# Patient Record
Sex: FEMALE | Race: WHITE | NOT HISPANIC OR LATINO | Employment: FULL TIME | URBAN - METROPOLITAN AREA
[De-identification: names, ages, dates, MRNs, and addresses within clinical notes are randomized per-mention and may not be internally consistent; named-entity substitution may affect disease eponyms.]

---

## 2017-01-06 ENCOUNTER — OFFICE VISIT - HEALTHEAST (OUTPATIENT)
Dept: BEHAVIORAL HEALTH | Facility: CLINIC | Age: 37
End: 2017-01-06

## 2017-01-06 DIAGNOSIS — F41.1 GENERALIZED ANXIETY DISORDER: ICD-10-CM

## 2017-01-07 ENCOUNTER — AMBULATORY - HEALTHEAST (OUTPATIENT)
Dept: BEHAVIORAL HEALTH | Facility: CLINIC | Age: 37
End: 2017-01-07

## 2017-01-09 ENCOUNTER — COMMUNICATION - HEALTHEAST (OUTPATIENT)
Dept: FAMILY MEDICINE | Facility: CLINIC | Age: 37
End: 2017-01-09

## 2017-01-17 ENCOUNTER — OFFICE VISIT - HEALTHEAST (OUTPATIENT)
Dept: BEHAVIORAL HEALTH | Facility: CLINIC | Age: 37
End: 2017-01-17

## 2017-01-17 DIAGNOSIS — F41.1 GENERALIZED ANXIETY DISORDER: ICD-10-CM

## 2017-01-31 ENCOUNTER — OFFICE VISIT - HEALTHEAST (OUTPATIENT)
Dept: BEHAVIORAL HEALTH | Facility: CLINIC | Age: 37
End: 2017-01-31

## 2017-01-31 DIAGNOSIS — F41.1 GENERALIZED ANXIETY DISORDER: ICD-10-CM

## 2017-03-07 ENCOUNTER — OFFICE VISIT - HEALTHEAST (OUTPATIENT)
Dept: BEHAVIORAL HEALTH | Facility: CLINIC | Age: 37
End: 2017-03-07

## 2017-03-07 DIAGNOSIS — F41.1 GENERALIZED ANXIETY DISORDER: ICD-10-CM

## 2017-03-20 ENCOUNTER — OFFICE VISIT - HEALTHEAST (OUTPATIENT)
Dept: BEHAVIORAL HEALTH | Facility: CLINIC | Age: 37
End: 2017-03-20

## 2017-03-20 DIAGNOSIS — F41.1 GENERALIZED ANXIETY DISORDER: ICD-10-CM

## 2017-04-24 ENCOUNTER — OFFICE VISIT - HEALTHEAST (OUTPATIENT)
Dept: BEHAVIORAL HEALTH | Facility: CLINIC | Age: 37
End: 2017-04-24

## 2017-04-24 DIAGNOSIS — F41.1 GENERALIZED ANXIETY DISORDER: ICD-10-CM

## 2017-05-23 ENCOUNTER — OFFICE VISIT - HEALTHEAST (OUTPATIENT)
Dept: BEHAVIORAL HEALTH | Facility: CLINIC | Age: 37
End: 2017-05-23

## 2017-05-23 DIAGNOSIS — F41.1 GENERALIZED ANXIETY DISORDER: ICD-10-CM

## 2017-05-24 ENCOUNTER — AMBULATORY - HEALTHEAST (OUTPATIENT)
Dept: BEHAVIORAL HEALTH | Facility: CLINIC | Age: 37
End: 2017-05-24

## 2017-06-28 ENCOUNTER — OFFICE VISIT - HEALTHEAST (OUTPATIENT)
Dept: BEHAVIORAL HEALTH | Facility: CLINIC | Age: 37
End: 2017-06-28

## 2017-06-28 DIAGNOSIS — F41.1 GENERALIZED ANXIETY DISORDER: ICD-10-CM

## 2017-07-25 ENCOUNTER — OFFICE VISIT - HEALTHEAST (OUTPATIENT)
Dept: BEHAVIORAL HEALTH | Facility: CLINIC | Age: 37
End: 2017-07-25

## 2017-07-25 DIAGNOSIS — F41.1 GENERALIZED ANXIETY DISORDER: ICD-10-CM

## 2017-08-28 ENCOUNTER — OFFICE VISIT - HEALTHEAST (OUTPATIENT)
Dept: BEHAVIORAL HEALTH | Facility: CLINIC | Age: 37
End: 2017-08-28

## 2017-08-28 DIAGNOSIS — F41.1 GENERALIZED ANXIETY DISORDER: ICD-10-CM

## 2017-12-20 ENCOUNTER — AMBULATORY - HEALTHEAST (OUTPATIENT)
Dept: BEHAVIORAL HEALTH | Facility: CLINIC | Age: 37
End: 2017-12-20

## 2017-12-20 ENCOUNTER — OFFICE VISIT - HEALTHEAST (OUTPATIENT)
Dept: BEHAVIORAL HEALTH | Facility: CLINIC | Age: 37
End: 2017-12-20

## 2017-12-20 DIAGNOSIS — F41.1 GENERALIZED ANXIETY DISORDER: ICD-10-CM

## 2018-01-04 ENCOUNTER — OFFICE VISIT - HEALTHEAST (OUTPATIENT)
Dept: BEHAVIORAL HEALTH | Facility: CLINIC | Age: 38
End: 2018-01-04

## 2018-01-04 DIAGNOSIS — Z91.199 FAILURE TO ATTEND APPOINTMENT: ICD-10-CM

## 2018-06-03 ENCOUNTER — RECORDS - HEALTHEAST (OUTPATIENT)
Dept: ADMINISTRATIVE | Facility: OTHER | Age: 38
End: 2018-06-03

## 2018-08-16 ENCOUNTER — OFFICE VISIT - HEALTHEAST (OUTPATIENT)
Dept: FAMILY MEDICINE | Facility: CLINIC | Age: 38
End: 2018-08-16

## 2018-08-16 DIAGNOSIS — D22.9 BENIGN NEVUS: ICD-10-CM

## 2018-08-16 DIAGNOSIS — L72.3 SEBACEOUS CYST: ICD-10-CM

## 2018-08-29 ENCOUNTER — OFFICE VISIT - HEALTHEAST (OUTPATIENT)
Dept: BEHAVIORAL HEALTH | Facility: CLINIC | Age: 38
End: 2018-08-29

## 2018-08-29 ENCOUNTER — AMBULATORY - HEALTHEAST (OUTPATIENT)
Dept: BEHAVIORAL HEALTH | Facility: CLINIC | Age: 38
End: 2018-08-29

## 2018-08-29 DIAGNOSIS — F41.1 GENERALIZED ANXIETY DISORDER: ICD-10-CM

## 2018-09-07 ENCOUNTER — OFFICE VISIT - HEALTHEAST (OUTPATIENT)
Dept: FAMILY MEDICINE | Facility: CLINIC | Age: 38
End: 2018-09-07

## 2018-09-07 DIAGNOSIS — Z13.220 LIPID SCREENING: ICD-10-CM

## 2018-09-07 DIAGNOSIS — Z13.1 SCREENING FOR DIABETES MELLITUS: ICD-10-CM

## 2018-09-07 DIAGNOSIS — Z00.00 ROUTINE GENERAL MEDICAL EXAMINATION AT A HEALTH CARE FACILITY: ICD-10-CM

## 2018-09-07 ASSESSMENT — MIFFLIN-ST. JEOR: SCORE: 1448.02

## 2018-09-12 ENCOUNTER — OFFICE VISIT - HEALTHEAST (OUTPATIENT)
Dept: BEHAVIORAL HEALTH | Facility: CLINIC | Age: 38
End: 2018-09-12

## 2018-09-12 ENCOUNTER — AMBULATORY - HEALTHEAST (OUTPATIENT)
Dept: LAB | Facility: CLINIC | Age: 38
End: 2018-09-12

## 2018-09-12 DIAGNOSIS — F41.1 GENERALIZED ANXIETY DISORDER: ICD-10-CM

## 2018-09-12 DIAGNOSIS — Z13.1 SCREENING FOR DIABETES MELLITUS: ICD-10-CM

## 2018-09-12 DIAGNOSIS — Z13.220 LIPID SCREENING: ICD-10-CM

## 2018-09-12 LAB
CHOLEST SERPL-MCNC: 189 MG/DL
FASTING STATUS PATIENT QL REPORTED: YES
FASTING STATUS PATIENT QL REPORTED: YES
GLUCOSE BLD-MCNC: 105 MG/DL (ref 70–99)
HDLC SERPL-MCNC: 63 MG/DL
LDLC SERPL CALC-MCNC: 116 MG/DL
TRIGL SERPL-MCNC: 50 MG/DL

## 2018-09-20 ENCOUNTER — OFFICE VISIT - HEALTHEAST (OUTPATIENT)
Dept: BEHAVIORAL HEALTH | Facility: CLINIC | Age: 38
End: 2018-09-20

## 2018-09-20 DIAGNOSIS — F41.1 GENERALIZED ANXIETY DISORDER: ICD-10-CM

## 2018-10-02 ENCOUNTER — OFFICE VISIT - HEALTHEAST (OUTPATIENT)
Dept: BEHAVIORAL HEALTH | Facility: CLINIC | Age: 38
End: 2018-10-02

## 2018-10-02 DIAGNOSIS — F41.1 GENERALIZED ANXIETY DISORDER: ICD-10-CM

## 2018-12-19 ENCOUNTER — AMBULATORY - HEALTHEAST (OUTPATIENT)
Dept: BEHAVIORAL HEALTH | Facility: CLINIC | Age: 38
End: 2018-12-19

## 2018-12-19 ENCOUNTER — OFFICE VISIT - HEALTHEAST (OUTPATIENT)
Dept: BEHAVIORAL HEALTH | Facility: CLINIC | Age: 38
End: 2018-12-19

## 2018-12-19 DIAGNOSIS — F41.1 GENERALIZED ANXIETY DISORDER: ICD-10-CM

## 2020-03-02 ENCOUNTER — HEALTH MAINTENANCE LETTER (OUTPATIENT)
Age: 40
End: 2020-03-02

## 2020-08-25 ENCOUNTER — OFFICE VISIT - HEALTHEAST (OUTPATIENT)
Dept: FAMILY MEDICINE | Facility: CLINIC | Age: 40
End: 2020-08-25

## 2020-08-25 DIAGNOSIS — Z00.00 ROUTINE HISTORY AND PHYSICAL EXAMINATION OF ADULT: ICD-10-CM

## 2020-08-25 DIAGNOSIS — Z12.4 SCREENING FOR CERVICAL CANCER: ICD-10-CM

## 2020-08-25 ASSESSMENT — MIFFLIN-ST. JEOR: SCORE: 1426.14

## 2020-08-26 ENCOUNTER — COMMUNICATION - HEALTHEAST (OUTPATIENT)
Dept: FAMILY MEDICINE | Facility: CLINIC | Age: 40
End: 2020-08-26

## 2020-08-26 LAB
HPV SOURCE: NORMAL
HUMAN PAPILLOMA VIRUS 16 DNA: NEGATIVE
HUMAN PAPILLOMA VIRUS 18 DNA: NEGATIVE
HUMAN PAPILLOMA VIRUS FINAL DIAGNOSIS: NORMAL
HUMAN PAPILLOMA VIRUS OTHER HR: NEGATIVE
SPECIMEN DESCRIPTION: NORMAL

## 2020-11-11 ENCOUNTER — RECORDS - HEALTHEAST (OUTPATIENT)
Dept: ADMINISTRATIVE | Facility: OTHER | Age: 40
End: 2020-11-11

## 2020-12-14 ENCOUNTER — HEALTH MAINTENANCE LETTER (OUTPATIENT)
Age: 40
End: 2020-12-14

## 2021-04-18 ENCOUNTER — HEALTH MAINTENANCE LETTER (OUTPATIENT)
Age: 41
End: 2021-04-18

## 2021-06-01 VITALS — WEIGHT: 163 LBS | HEIGHT: 67 IN | BODY MASS INDEX: 25.58 KG/M2

## 2021-06-01 VITALS — BODY MASS INDEX: 25.8 KG/M2 | WEIGHT: 163.5 LBS

## 2021-06-04 VITALS
HEIGHT: 67 IN | HEART RATE: 90 BPM | WEIGHT: 159.05 LBS | DIASTOLIC BLOOD PRESSURE: 64 MMHG | OXYGEN SATURATION: 96 % | BODY MASS INDEX: 24.96 KG/M2 | SYSTOLIC BLOOD PRESSURE: 106 MMHG

## 2021-06-08 NOTE — PROGRESS NOTES
Outpatient Mental Health Treatment Plan    Name:  Laney Flood  :  1980  MRN:  [id number]    Treatment Plan:  Updated Treatment Plan  Intake/initial treatment plan date:  16, Update: 17  Benefit and risks and alternatives have been discussed: Yes  Is this treatment appropriate with minimal intrusion/restrictions: Yes  Estimated duration of treatment:  Approximately 10-12 sessions.  Anticipated frequency of services:  Every 1-2 weeks  Necessity for frequency: This frequency is needed to establish therapeutic goals and for continuity of care in order to monitor progress.  Necessity for treatment: To address cognitive, behavioral, and/or emotional barriers in order to work toward goals and to improve quality of life.    Plan:           ?   ? Anxiety    Goal:  Decrease average anxiety level from 3/4 to 2/4.   Strategies: ? [x]Learn and practice relaxation techniques and other coping strategies (e.g., thought stopping, reframing, meditation)     ? [x] Increase involvement in meaningful activities     ? [x] Discuss sleep hygiene     ? [x] Explore thoughts and expectations about self and others     ? [x] Identify and monitor triggers for panic/anxiety symptoms     ? [x] Implement physical activity routine (with physician approval)     ? [x] Consider introduction of bibliotherapy and/or videos     ? [x] Continue compliance with medical treatment plan (or explore barriers)                                       []     ?Degree to which this is a problem: 3  Degree to which goal is met: in progress  Date of Review: in 3 months.       Functional Impairment:  1=Not at all/Rarely  2=Some days  3=Most Days  4=Every Day    Personal : 3  Family : 2  Social : 2   Work/school : 3    Diagnosis:    Generalized Anxiety disorder     WHODAS 2.0 12-item version 3       Scores presented in qualifiers to represent level of disability.  MILD Problem (slight, low, ...) 5-24%     H1= 0  H2= 0  H3= 0    Clinical assessments and  "measures completed:. CLARA-7, PHQ-9, CAGE-AID and PANSI     Strengths:Laney is a planful person and someone who likes to be around people and gets along well with them. She considers herself to be a good friend and values having fun.  Limitations:  Laney has a tendency to be hard on herself and over-analyzing/over-thinking. She is concerned with finances.   Cultural Considerations: Lnaey grew up in a small city in [place], a member of a large family where she learned to be resourceful and get along with everyone. She states, \"My family is very much in your business. That's how we are.\"       Persons responsible for this plan: Patient            Psychotherapist Signature           Patient Signature:              Guardian Signature             Provider: Performed and documented by Melita [facility]   Date:  1/7/2017      This note was created with help of Dragon dictation software.  Grammatical / typing errors are not intentional and inherent to the software.    "

## 2021-06-08 NOTE — PROGRESS NOTES
1/17/17  Start time: 4:08 PM    Stop Time: 5:08 PM   Session # 2    Laney Flood is a 36 y.o. female is being seen today for    Chief Complaint   Patient presents with     Follow-up     Anxiety   .     New symptoms or complaints: Reported none.    Functional Impairment:   Personal: 3  Family: 3  Work: 2  Social:2    Clinical assessment of mental status: Laney presented on time for her appointment. She was oriented x3, open and cooperative, and dressed appropriately for this session and weather. Her memory was Normal cognitive functioning . Her speech was within normal. Language was appropriate to discussion. Concentration and focus is Within normal. Psychosis is not noted nor reported. Her mood is euthymic. Congruent w/content of speech. Affect is congruent with speech and is Congruent w/content of speech. Fund of knowledge is adequate. Insight is adequate for therapy.    Suicidal/Homicidal Ideation present: None Reported This Session    Patient's impression of their current status: Patient discussed work and relationship issues. She processed some of the sources of stress in her life, including her 's health issues and how this impacts their lives.      Therapist impression of patients current state: Patient is engaged in the psychotherapeutic process. Processed thoughts, feelings, beliefs. She is willing to explore coping techniques, including thought defusion techniques and identifying 'unhelpful thoughts' and introducing alternative healthy behaviors in the face of them.  She considers concepts of self that contribute to anxiety.  She is very insightful and receptive to framework for learning to live in different relationship with anxiety while also willing to learn/apply CBT strategies.     Type of psychotherapeutic technique provided: Insight oriented, Client centered, Solution-focused and CBT    Progress toward short term goals:Progress as expected, patient is applying techniques discussed in  "session to good benefit.    Review of long term goals: Patient is making good progress on her long-term goals.     Diagnosis:   1. Generalized anxiety disorder        Plan and Follow up: Patient agreed to continue to apply 3-2-1 strategy for reducing anxiety/panic symptoms, as needed and to practice looking \"at thoughts rather than from thoughts\", applying defusion strategy to unhelpful thoughts. Daily breathing exercise (15 minutes/day), to practice holding attention on sensing.  Plans to return in 2 weeks.     Discharge Criteria/Planning: Patient will continue with follow-up until therapy can be discontinued without return of signs and symptoms.          "

## 2021-06-08 NOTE — PROGRESS NOTES
"1/6/17  Start time: 3:12 PM    Stop Time: 4:04 PM   Session # 1    Laney Flood is a 36 y.o. female is being seen today for    Chief Complaint   Patient presents with     Follow-up     Anxiety   .     New symptoms or complaints: Laney is re-establishing therapy today after about 4 months, during which time her  underwent a kidney transplant.  She indicated she had acute anxiety/panic twice in the last month.  She used grounding breathing exercises which helped her restore her calm.       Functional Impairment:   Personal: 3  Family: 3  Work: 2  Social:2    Clinical assessment of mental status: Laney presented on time for her appointment. She was oriented x3, open and cooperative, and dressed appropriately for this session and weather. Her memory was Normal cognitive functioning . Her speech was within normal. Language was appropriate to discussion. Concentration and focus is Within normal. Psychosis is not noted nor reported. Her mood is euthymic. Congruent w/content of speech. Affect is congruent with speech and is Congruent w/content of speech. Fund of knowledge is adequate. Insight is adequate for therapy.    Suicidal/Homicidal Ideation present: None Reported This Session    Patient's impression of their current status: Patient stated, \"I care too much what others think.\"  She discussed work and relationship issues with her . She processed some of the sources of stress in her life, and refected on historical influences that have shaped avoidance coping behaviors.        Therapist impression of patients current state: Patient is engaged in the psychotherapeutic process. Processed thoughts, feelings, beliefs. She is willing to explore coping techniques, including thought defusion techniques and identifying 'unhelpful thoughts' and introducing alternative healthy behaviors in the face of them.  She considers concepts of self that contribute to anxiety.  She is very insightful and receptive to " "framework for learning to live in different relationship with anxiety while also willing to learn/apply CBT strategies.     Type of psychotherapeutic technique provided: Insight oriented, Client centered, Solution-focused and CBT    Progress toward short term goals:Progress as expected, patient is applying techniques discussed in session to good benefit.    Review of long term goals: Treatment Plan updated.    Diagnosis:   1. Generalized anxiety disorder        Plan and Follow up: Patient agreed to continue to apply 3-2-1 strategy for reducing anxiety/panic symptoms, as needed and to practice looking \"at thoughts rather than from thoughts\", applying defusion strategy to unhelpful thoughts. Daily breathing exercise (15 minutes/day), to practice holding attention on sensing.  Plans to return in 2 weeks.     Discharge Criteria/Planning: Patient will continue with follow-up until therapy can be discontinued without return of signs and symptoms.          "

## 2021-06-08 NOTE — PROGRESS NOTES
1/17/17  Start time: 4:08 PM    Stop Time: 5:08 PM   Session # 3    Laney Flood is a 36 y.o. female is being seen today for    Chief Complaint   Patient presents with     Follow-up     Anxiety   .     New symptoms or complaints: Reported none.    Functional Impairment:   Personal: 3  Family: 3  Work: 2  Social:2    Clinical assessment of mental status: Laney presented on time for her appointment. She was oriented x3, open and cooperative, and dressed appropriately for this session and weather. Her memory was Normal cognitive functioning . Her speech was within normal. Language was appropriate to discussion. Concentration and focus is Within normal. Psychosis is not noted nor reported. Her mood is euthymic. Congruent w/content of speech. Affect is congruent with speech and is Congruent w/content of speech. Fund of knowledge is adequate. Insight is adequate for therapy.    Suicidal/Homicidal Ideation present: None Reported This Session    Patient's impression of their current status: Patient discussed work and relationship issues. She processed some of the primary sources of stress in her life, including her 's health issues and how this impacts their lives.      Therapist impression of patients current state: Patient is engaged in the psychotherapeutic process. Processed thoughts, feelings, beliefs. She is willing to explore coping techniques, including thought defusion techniques and identifying 'unhelpful thoughts' and introducing alternative healthy behaviors in the face of them.  She considers concepts of self that contribute to anxiety.  She is very insightful and receptive to framework for learning to live in different relationship with anxiety while also willing to learn/apply CBT strategies.     Type of psychotherapeutic technique provided: Insight oriented, Client centered, Solution-focused and CBT    Progress toward short term goals:Progress as expected, patient is applying techniques discussed  "in session to good benefit.    Review of long term goals: Patient is making good progress on her long-term goals.     Diagnosis:   1. Generalized anxiety disorder        Plan and Follow up: Patient plans to continue to apply 3-2-1 strategy for reducing anxiety/panic symptoms, as needed and to practice looking \"at thoughts rather than from thoughts\", applying defusion strategy to unhelpful thoughts. Daily breathing exercise (15 minutes/day), to practice holding attention on sensing.  Plans to return in 3 weeks.     Discharge Criteria/Planning: Patient will continue with follow-up until therapy can be discontinued without return of signs and symptoms.          "

## 2021-06-09 NOTE — PROGRESS NOTES
"Mental Health Visit Note    3/20/17    Start time: 5:09 PM    Stop Time: 6:10 PM   Session # 5    Laney Flood is a 36 y.o. female is being seen today for    Chief Complaint   Patient presents with     Follow-up     Anxiety   .     New symptoms or complaints: Patient plans to discuss anxiety medications at her next PCP visit.     Functional Impairment:   Personal: 3  Family: 3  Work: 3  Social:3    Clinical assessment of mental status:Laney presented on time for her appointment. She was oriented x3, open and cooperative, and dressed appropriately for this session and weather. Her memory was Normal cognitive functioning . Her speech was within normal. Language was appropriate to discussion. Concentration and focus is Within normal. Psychosis is not noted nor reported. Her mood is euthymic. Congruent w/content of speech. Affect is anxious. Fund of knowledge is adequate. Insight is adequate for therapy.     Suicidal/Homicidal Ideation present: None Reported This Session    Patient's impression of their current status: Patient discussed an upcoming business trip that she will be taking with her direct supervisor and some of the stressful aspects she is imagining of this trip.  She acknowledged feeling pressure to \"absorb all the information\" at the conference, while also recognizing that this is not an expectation.  She considered other expectations that she places on herself that she realizes do not align with what is actually true for her.  She described feeling both excited and nervous about the trip, and reflected on the healthy coping techniques that will help her.  She identified an interaction at work with her director which was encouraging and motivating for her as a source of perspective as she leaves for this conference.  She is considering whether a medication may be helpful for her at this time, something she recently discussed with her  after breaking down in tears thinking about being away from " "him for a few days.  While he has made important strides in his health, she considered the impact of his transplant and convalescence on her.   She plans to discuss this with her PCP at her next visit.      Therapist impression of patients current state: Patient is engaged in the psychotherapeutic process. Processed thoughts, feelings, beliefs. She is willing to explore coping techniques, including thought defusion techniques and identifying 'unhelpful thoughts' and introducing alternative healthy behaviors in the face of them. She considers concepts of self that contribute to anxiety and is receptive to strategies for recognizing and changing self talk. She is very insightful and receptive to framework for learning to live in different relationship with anxiety while also willing to learn/apply CBT strategies.     Type of psychotherapeutic technique provided: Insight oriented, Client centered, Solution-focused and CBT    Progress toward short term goals:Progress as expected, patient is applying techniques discussed in session to good benefit.    Review of long term goals: Patient is making progress on her long-term goals.    Diagnosis:   1. Generalized anxiety disorder        Plan and Follow up: Patient plans to continue to apply 3-2-1 strategy for reducing anxiety/panic symptoms, as needed, and to practice looking \"at thoughts rather than from thoughts\", applying defusion strategy to unhelpful thoughts. Patient plans to continue to use other ACT defusion techniques and some more breathing strategies (such as Square breathing, Yoga breathing). Plans to continue to nurture mindfulness exercise (15 minutes/day), to practice holding attention on sensing. Plans to return in 2-3 weeks.      Discharge Criteria/Planning: Patient will continue with follow-up until therapy can be discontinued without return of signs and symptoms.    Melita Ramirez 3/21/2017      This note was created with help of Dragon dictation " software. Grammatical / typing errors are not intentional and inherent to the software.

## 2021-06-09 NOTE — PROGRESS NOTES
3/7/17  Start time: 4:04 PM    Stop Time: 5:03 PM   Session # 4    Laney Flood is a 36 y.o. female is being seen today for    Chief Complaint   Patient presents with     Follow-up     Anxiety   .     New symptoms or complaints: Reported none.    Functional Impairment:   Personal: 4  Family: 3  Work: 4  Social:2    Clinical assessment of mental status: Laney presented on time for her appointment. She was oriented x3, open and cooperative, and dressed appropriately for this session and weather. Her memory was Normal cognitive functioning . Her speech was within normal. Language was appropriate to discussion. Concentration and focus is Within normal. Psychosis is not noted nor reported. Her mood is euthymic. Congruent w/content of speech. Affect is anxious. Fund of knowledge is adequate. Insight is adequate for therapy.    Suicidal/Homicidal Ideation present: None Reported This Session    Patient's impression of their current status: Patient discussed work and relationship issues. 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  She reflected that although work is stressful, she is using healthy coping and engaging in open, respectful communication in the work environment, aligned with her values.  She identified a couple times this past week where she has experienced acute anxiety in situations outside of work that surprised her, such as going to an athletic game at the [employer] field with her .  She considered that the location of their seats in the nosebleed section and positioned at a steep angle contributed to her anxiety.  She utilized grounding and breathing techniques in the moments.      Therapist impression of patients current state: Patient is engaged in the psychotherapeutic process. Processed thoughts, feelings, beliefs. She is willing to explore  "coping techniques, including thought defusion techniques and identifying 'unhelpful thoughts' and introducing alternative healthy behaviors in the face of them.  She considers concepts of self that contribute to anxiety.  She is very insightful and receptive to framework for learning to live in different relationship with anxiety while also willing to learn/apply CBT strategies.     Type of psychotherapeutic technique provided: Insight oriented, Client centered, Solution-focused and CBT    Progress toward short term goals:Progress as expected, patient is applying techniques discussed in session to good benefit.    Review of long term goals: Patient is making good progress on her long-term goals.     Diagnosis:   1. Generalized anxiety disorder        Plan and Follow up: Patient plans to continue to apply 3-2-1 strategy for reducing anxiety/panic symptoms, as needed and to practice looking \"at thoughts rather than from thoughts\", applying defusion strategy to unhelpful thoughts. Introduced other ACT defusion techniques today to try and some more breathing strategies (such as Square breathing).  Plans to continue to nurture mindfulness exercise (15 minutes/day), to practice holding attention on sensing.  Plans to return in 3 weeks.     Discharge Criteria/Planning: Patient will continue with follow-up until therapy can be discontinued without return of signs and symptoms.          "

## 2021-06-10 NOTE — PROGRESS NOTES
Outpatient Mental Health Treatment Plan    Name:  Laney Flood  :  1980  MRN:  [id number]    Treatment Plan:  Updated Treatment Plan  Intake/initial treatment plan date:  Last update 17.  Today's update: 17  Benefit and risks and alternatives have been discussed: Yes  Is this treatment appropriate with minimal intrusion/restrictions: Yes  Estimated duration of treatment:  Approximately 8-10 sessions.  Anticipated frequency of services:  Every 2-3 weeks  Necessity for frequency: This frequency is needed to establish therapeutic goals and for continuity of care in order to monitor progress.  Necessity for treatment: To address cognitive, behavioral, and/or emotional barriers in order to work toward goals and to improve quality of life.    Plan:         ?   ? Anxiety    Goal:  Decrease average anxiety level from 3/4 to 2/4.   Strategies: ? [x]Learn and practice relaxation techniques and other coping strategies (e.g., thought stopping, reframing, meditation)     ? [x] Increase involvement in meaningful activities     ? [x] Discuss sleep hygiene     ? [x] Explore thoughts and expectations about self and others     ? [x] Identify and monitor triggers for panic/anxiety symptoms     ? [x] Implement physical activity routine (with physician approval)     ? [x] Consider introduction of bibliotherapy and/or videos     ? [x] Continue compliance with medical treatment plan (or explore barriers)                                       []     ?Degree to which this is a problem: 3  Degree to which goal is met: in progress  Date of Review: in 3 months.    Functional Impairment:  1=Not at all/Rarely  2=Some days  3=Most Days  4=Every Day    Personal : 3  Family : 2  Social : 2   Work/school : 3     Diagnosis:     Generalized Anxiety disorder      WHODAS 2.0 12-item version 3        Scores presented in qualifiers to represent level of disability.  MILD Problem (slight, low, ...) 5-24%      H1= 0  H2= 0  H3=  "0     Clinical assessments and measures completed:. CLARA-7, PHQ-9, CAGE-AID and PANSI      Strengths:Laney is a planful person and someone who likes to be around people and gets along well with them. She considers herself to be a good friend and values having fun.  Limitations:  Laney has a tendency to be hard on herself and over-analyzing/over-thinking. She is concerned with finances.  Cultural Considerations: Laney grew [place] in [place] small city in [place], a member of a large family where she learned to be resourceful and get along with everyone. She states, \"My family is very much in your business. That's how we are.\"       Persons responsible for this plan: Patient            Psychotherapist Signature           Patient Signature:              Guardian Signature             Provider: Performed and documented by ZACH Betancourt   Date:  5/24/2017      This note was created with help of Dragon dictation software.  Grammatical / typing errors are not intentional and inherent to the software.    "

## 2021-06-10 NOTE — PROGRESS NOTES
"FEMALE PREVENTATIVE EXAM    Assessment and Plan:       1. Routine history and physical examination of adult  Well woman    2. Screening for cervical cancer  - Gynecologic Cytology (PAP Smear)  - HPV High Risk DNA Cervical      Next follow up:  Return as needed.    Immunization Review  Adult Imm Review: No immunizations due today    I discussed the following with the patient:   Adult Healthy Living: Importance of regular exercise  Healthy nutrition  Stress management   Posture checks,  Stretching for muscle tightness    Christiana Beach MD      Subjective:   Chief Complaint: Laney Flood is an 40 y.o. female here for a preventative health visit.     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 Her  Momo  Is not working    She has had some sinus problems and  lot of stuffiness - [facility] - started generic zyrtec and prn sudafed - this has helped when she takes it. If she is \"sleeping weird\" she has pressure around right ear - Does not chew gum - no pain with chewing. She is in the process of getting implant in tooth. When she has her sinus issues, feels pressure in both cheeks but more right, through teeth.  She  Also notes pain BEHIND ears (neck support muscle insertion) and shoulders    Healthy Habits  Are you taking a daily aspirin? No  Do you typically exercising at least 40 min, 3-4 times per week?  NO Does stretches with Betty case - [employer] - stretch - 20 minutes a day - 6 am ,yoga a couple of times  Do you usually eat at least 4 servings of fruit and vegetables a day, include whole grains and fiber and avoid regularly eating high fat foods? Yes  tying to eat well - health challenge at work to try and increase veggies and fruits  Have you had an eye exam in the past two years? NO - has had a floater in the past - recently - not like a back dot but something fuzzy - random - did yoa " "outside and looked dup and it happened - like a blur in one spot - not in the same place - goes away - sensitivity to the sun. It's been over a year since she went to an eye doctor.  More often with the sun - a couple of times a month  Do you see a dentist twice per year? Yes  Do you have any concerns regarding sleep? YES - got a new bed, trying to work out, waking up with clenched jaws - has a new boss - life is a lot better      Safety Screen  If you own firearms, are they secured in a locked gun cabinet or with trigger locks? The patient does not own any firearms  Do you feel you are safe where you are living?: Yes (8/25/2020  9:44 AM)  Do you feel you are safe in your relationship(s)?: Yes (8/25/2020  9:44 AM)      Review of Systems:    Constitutional: negative for recent illness or change in weight  Eyes: negative for irritation and vision change  Ears, nose, mouth, throat, and face: negative for nasal congestion and sore throat  Respiratory: negative for cough and dyspnea on exertion  Cardiovascular: negative for chest pain and palpitations  Gastrointestinal: negative for abdominal pain and change in bowel habits  Genitourinary:negative for dysuria, frequency and hesitancy  Integument/breast: negative for rash  Hematologic/lymphatic: negative for bleeding and easy bruising  Musculoskeletal:negative for arthralgias and myalgias  Neurological: negative for dizziness, headaches and paresthesia        Cancer Screening       Status Date      PAP SMEAR Overdue 3/26/2018      Done 3/26/2015       pap done today        History     Reviewed By Date/Time Sections Reviewed    Christiana Beach MD 8/25/2020 10:20 AM Medical, Surgical    [facility] Pat[facility] 8/25/2020  9:46 AM Tobacco            Objective:   Vital Signs:   Visit Vitals  /64 (Patient Site: Left Arm, Patient Position: Sitting, Cuff Size: Adult Regular)   Pulse 90   Ht 5' 6.5\" (1.689 m)   Wt 159 lb 0.8 oz (72.1 kg)   LMP 08/18/2020 (Exact Date)   SpO2 96% "   Breastfeeding No   BMI 25.29 kg/m           PHYSICAL EXAM  General appearance - alert, well appearing, and in no distress  Mental status - normal mood, behavior, speech, dress, motor activity, and thought processes  Eyes - pupils equal and reactive, extraocular eye movements intact, funduscopic exam normal, discs flat and sharp  Ears - bilateral TM's and external ear canals normal  Mouth - mucous membranes moist, pharynx normal without lesions  Neck - supple, no significant adenopathy, carotids upstroke normal bilaterally, no bruits, thyroid exam: thyroid is normal in size without nodules or tenderness  Chest - clear to auscultation, no wheezes, rales or rhonchi, symmetric air entry  Heart - normal rate, regular rhythm, normal S1, S2, no murmurs, rubs, clicks or gallops  Abdomen - soft, nontender, nondistended, no masses or organomegaly  Breasts - breasts appear normal, no suspicious masses, no skin or nipple changes or axillary nodes  Pelvic exam: normal external genitalia, vulva, vagina, cervix(slightly friable), uterus and adnexa.upper   MS - point tenderness in upper trapezius, occiput on muscle insertion points.  No tenderness over TMJ joints  Neurological - alert, oriented, normal speech, no focal findings or movement disorder noted, DTR's normal and symmetric  Extremities - peripheral pulses normal, no pedal edema, no clubbing or cyanosis  Skin - no rashes or worrisome lesions

## 2021-06-10 NOTE — PROGRESS NOTES
Mental Health Visit Note    5/23/17    Start time: 4:07 PM    Stop Time: 5:06 PM   Session # 7    Laney Flood is a 36 y.o. female is being seen today for    Chief Complaint   Patient presents with     Follow-up     Anxiety   .     New symptoms or complaints: None    Functional Impairment:   Personal: 3  Family: 3  Work: 2  Social:3    Clinical assessment of mental status:Laney presented on time for her appointment. She was oriented x3, open and cooperative, and dressed appropriately for this session and weather. Her memory was Normal cognitive functioning . Her speech was within normal. Language was appropriate to discussion. Concentration and focus is Within normal. Psychosis is not noted nor reported. Her mood is euthymic. Congruent w/content of speech. Affect is anxious. Fund of knowledge is adequate. Insight is adequate for therapy.     Suicidal/Homicidal Ideation present: None Reported This Session    Patient's impression of their current status: Patient discussed psycho-social stressors.  Her 's health is improving and stable, and with this change she reflected on her expectations on self and him.  She reflected on their communication patterns and considered how they are influenced by their individual family cultures that they grew up in.  She continues to author the values that are most meaningful in her life, such as marriage, family, and authenticity, and the behaviors that represent them.       Therapist impression of patients current state: Patient is engaged in the psychotherapeutic process. Processed thoughts, feelings, beliefs. She is willing to explore coping techniques, including thought defusion techniques and identifying 'unhelpful thoughts' and introducing alternative healthy behaviors in the face of them. She considers concepts of self that contribute to anxiety and is receptive to strategies for recognizing and changing self talk. She is very insightful and receptive to framework  "for learning to live in different relationship with anxiety while also willing to learn/apply CBT strategies.     Type of psychotherapeutic technique provided: Insight oriented, Client centered, Solution-focused and CBT    Progress toward short term goals:Progress as expected, patient is applying techniques discussed in session to good benefit.    Review of long term goals: Treatment Plan updated    Diagnosis:   1. Generalized anxiety disorder        Plan and Follow up: Patient plans to continue to apply 3-2-1 strategy for reducing anxiety/panic symptoms, as needed, and to practice looking \"at thoughts rather than from thoughts\", applying defusion strategy to unhelpful thoughts. Patient plans to continue to use other ACT defusion techniques and some more breathing strategies (such as Square breathing, Yoga breathing). Plans to continue to nurture mindfulness exercise (15 minutes/day), to practice holding attention on sensing. Interpersonal communication skills discussed within a Transactional Analysis model, psycho-educational material provided (with focus on communication with her spouse).  Plans to return in 2-3 weeks.      Discharge Criteria/Planning: Patient will continue with follow-up until therapy can be discontinued without return of signs and symptoms.    Melita Ramirez 5/24/2017      This note was created with help of Dragon dictation software. Grammatical / typing errors are not intentional and inherent to the software.  "

## 2021-06-11 NOTE — PROGRESS NOTES
Mental Health Visit Note    6/28/17    Start time: 4:12 PM    Stop Time: 5:11 PM   Session # 8    Laney Flood is a 37 y.o. female is being seen today for    Chief Complaint   Patient presents with     Follow-up     Anxiety   .     New symptoms or complaints: None    Functional Impairment:   Personal: 3  Family: 3  Work: 2  Social:3    Clinical assessment of mental status:Laney presented on time for her appointment. She was oriented x3, open and cooperative, and dressed appropriately for this session and weather. Her memory was Normal cognitive functioning . Her speech was within normal. Language was appropriate to discussion. Concentration and focus is Within normal. Psychosis is not noted nor reported. Her mood is euthymic. Congruent w/content of speech. Affect is anxious. Fund of knowledge is adequate. Insight is adequate for therapy.     Suicidal/Homicidal Ideation present: None Reported This Session    Patient's impression of their current status: Patient discussed psycho-social stressors, including work-related and marriage.  Her workplace is working with a consultant that is bringing in helpful trainings that are designed to address some of the stressful aspects of her work, notably with workplace culture.  Her 's health is improving and stable, and with this change she continues to reflect on her expectations on self and him.  She reflected on their communication patterns.  She continues to author the values that are most meaningful in her life, such as marriage, family, and authenticity, and the behaviors that represent them.       Therapist impression of patients current state: Patient is engaged in the psychotherapeutic process. Processed thoughts, feelings, beliefs. She is willing to explore coping techniques, including thought defusion techniques and identifying 'unhelpful thoughts' and introducing alternative healthy behaviors in the face of them. She considers concepts of self that  "contribute to anxiety and is receptive to strategies for recognizing and changing self talk. She is very insightful and receptive to framework for learning to live in different relationship with anxiety while also willing to learn/apply CBT strategies.     Type of psychotherapeutic technique provided: Insight oriented, Client centered, Solution-focused and CBT    Progress toward short term goals:Progress as expected, patient is applying techniques discussed in session to good benefit.    Review of long term goals: Patient is making progress on her long-term goals.    Diagnosis:   1. Generalized anxiety disorder        Plan and Follow up: Patient plans to continue to apply 3-2-1 strategy for reducing anxiety/panic symptoms, as needed, and to practice looking \"at thoughts rather than from thoughts\", applying defusion strategy to unhelpful thoughts. Patient plans to continue to use other ACT defusion techniques and some more breathing strategies (such as Square breathing, Yoga breathing). Plans to continue to nurture mindfulness exercise (15 minutes/day), to practice holding attention on sensing.  Provided recommended reading for marriage work (Omar Newell).  Plans to return in 2-3 weeks, as needed.      Discharge Criteria/Planning: Patient will continue with follow-up until therapy can be discontinued without return of signs and symptoms.    Melita Ramirez 6/28/2017      This note was created with help of Dragon dictation software. Grammatical / typing errors are not intentional and inherent to the software.  "

## 2021-06-12 NOTE — PROGRESS NOTES
Mental Health Visit Note    7/25/17    Start time: 4:08 PM    Stop Time: 5:07 PM   Session # 9    Laney Flood is a 37 y.o. female is being seen today for    Chief Complaint   Patient presents with     Follow-up     Anxiety   .     New symptoms or complaints: None    Functional Impairment:   Personal: 3  Family: 3  Work: 2  Social:2    Clinical assessment of mental status:Laney presented on time for her appointment. She was oriented x3, open and cooperative, and dressed appropriately for this session and weather. Her memory was Normal cognitive functioning . Her speech was within normal. Language was appropriate to discussion. Concentration and focus is Within normal. Psychosis is not noted nor reported. Her mood is euthymic. Congruent w/content of speech. Affect is anxious. Fund of knowledge is adequate. Insight is adequate for therapy.     Suicidal/Homicidal Ideation present: None Reported This Session    Patient's impression of their current status: Patient discussed challenges with regard to her 's health issues and the impact on their marriage.  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  She reflected further on the communication patterns with her .  She continues to author the values that are most meaningful in her life, such as marriage, family, and authenticity, and the behaviors that represent them.       Therapist impression of patients current state: Patient is engaged in the psychotherapeutic process. Processed thoughts, feelings, beliefs. She is willing to explore coping techniques, including thought defusion techniques and identifying 'unhelpful thoughts' and introducing alternative healthy behaviors in the face of them. She considers concepts of self that contribute to anxiety and is receptive to strategies for recognizing and changing self talk. She is very insightful and receptive to framework for learning to live in different relationship  "with anxiety while also willing to learn/apply CBT strategies.     Type of psychotherapeutic technique provided: Insight oriented, Client centered, Solution-focused and CBT    Progress toward short term goals:Progress as expected, patient is applying techniques discussed in session to good benefit.    Review of long term goals: Patient is making progress on her long-term goals.    Diagnosis:   1. Generalized anxiety disorder        Plan and Follow up: Patient plans to continue to apply 3-2-1 strategy for reducing anxiety/panic symptoms, as needed, and to practice looking \"at thoughts rather than from thoughts\", applying defusion strategy to unhelpful thoughts. Patient plans to continue to use other ACT defusion techniques and some more breathing strategies (such as Square breathing, Yoga breathing). Plans to continue to nurture mindfulness exercise (15 minutes/day), to practice holding attention on sensing.  Plans to return in on 8/28 or earlier, as needed.      Discharge Criteria/Planning: Patient will continue with follow-up until therapy can be discontinued without return of signs and symptoms.    Melita Ramirez 7/25/2017      This note was created with help of Dragon dictation software. Grammatical / typing errors are not intentional and inherent to the software.  "

## 2021-06-12 NOTE — PROGRESS NOTES
Mental Health Visit Note    8/28/17    Start time: 4:06 PM    Stop Time: 5:05 PM   Session # 10    Laney Flood is a 37 y.o. female is being seen today for    Chief Complaint   Patient presents with     Follow-up     Anxiety   .     New symptoms or complaints: None    Functional Impairment:   Personal: 4  Family: 3  Work: 2  Social:2    Clinical assessment of mental status:Laney presented on time for her appointment. She was oriented x3, open and cooperative, and dressed appropriately for this session and weather. Her memory was Normal cognitive functioning . Her speech was within normal. Language was appropriate to discussion. Concentration and focus is Within normal. Psychosis is not noted nor reported. Her mood is euthymic. Congruent w/content of speech. Affect is anxious. Fund of knowledge is adequate. Insight is adequate for therapy.     Suicidal/Homicidal Ideation present: None Reported This Session    Patient's impression of their current status: Patient discussed challenges with regard to her 's health issues and the impact on their marriage.  She recently attended his yearly transplant follow up and acknowledged feeling surprised by her emotional reaction in the visit where she began crying as the doctor brought up the subject of his return to work status.  She continues to feel pressure as the sole income earner and struggles with feelings alone in this pressure.  She continues to author the values that are most meaningful in her life, such as marriage, family, and authenticity, and the behaviors that represent them.       Therapist impression of patients current state: Patient is engaged in the psychotherapeutic process. Processed thoughts, feelings, beliefs. She is willing to explore coping techniques, including thought defusion techniques and identifying 'unhelpful thoughts' and introducing alternative healthy behaviors in the face of them. She considers concepts of self that contribute to  "anxiety and is receptive to strategies for recognizing and changing self talk. She is very insightful and receptive to framework for learning to live in different relationship with anxiety while also willing to learn/apply CBT strategies. She is also receptive to communication skills building within her marriage and hopes to bring her  to one of her upcoming sessions.      Type of psychotherapeutic technique provided: Insight oriented, Client centered, Solution-focused and CBT    Progress toward short term goals:Progress as expected, patient is applying techniques discussed in session to good benefit.    Review of long term goals: Patient is making progress on her long-term goals. Will complete Treatment Plan at next visit.    Diagnosis:   1. Generalized anxiety disorder        Plan and Follow up: Patient plans to continue to apply 3-2-1 strategy for reducing anxiety/panic symptoms, as needed, and to practice looking \"at thoughts rather than from thoughts\", applying defusion strategy to unhelpful thoughts. Patient plans to continue to use other ACT defusion techniques and some more breathing strategies (such as Square breathing, Yoga breathing). Plans to continue to nurture mindfulness exercise (15 minutes/day).  Plans to return in two weeks or as needed.      Discharge Criteria/Planning: Patient will continue with follow-up until therapy can be discontinued without return of signs and symptoms.    Melita Ramirez 8/28/2017      This note was created with help of Dragon dictation software. Grammatical / typing errors are not intentional and inherent to the software.  "

## 2021-06-14 NOTE — PROGRESS NOTES
Outpatient Mental Health Treatment Plan     Name:  Laney Flood  :  1980  MRN:  [id number]     Treatment Plan:  Updated Treatment Plan  Intake/initial treatment plan date:  Last update 17.  Updates: 17 & 17.  Benefit and risks and alternatives have been discussed: Yes  Is this treatment appropriate with minimal intrusion/restrictions: Yes  Estimated duration of treatment:  Approximately 8-10 sessions.  Anticipated frequency of services:  Every 2-3 weeks  Necessity for frequency: This frequency is needed to establish therapeutic goals and for continuity of care in order to monitor progress.  Necessity for treatment: To address cognitive, behavioral, and/or emotional barriers in order to work toward goals and to improve quality of life.     Plan:                                                                                                                                       ?                        ? Anxiety                                            Goal:              Decrease average anxiety level from 3/4 to 2/4.                        Strategies:                 ? [x]Learn and practice relaxation techniques and other coping strategies (e.g., thought stopping, reframing, meditation)                                                                    ? [x] Increase involvement in meaningful activities                                                                    ? [x] Discuss sleep hygiene                                                                    ? [x] Explore thoughts and expectations about self and others                                                                    ? [x] Identify and monitor triggers for panic/anxiety symptoms                                                                    ? [x] Implement physical activity routine (with physician approval)                                                                    ? [x] Consider introduction of  "bibliotherapy and/or videos                                                                    ? [x] Continue compliance with medical treatment plan (or explore barriers)                                                           []      ?Degree to which this is a problem: 3  Degree to which goal is met: in progress  Date of Review: in 3 months.     Functional Impairment:  1=Not at all/Rarely  2=Some days  3=Most Days  4=Every Day    Personal : 3  Family : 2  Social : 2   Work/school : 3      Diagnosis:      Generalized Anxiety disorder      WHODAS 2.0 12-item version 3        Scores presented in qualifiers to represent level of disability.  MILD Problem (slight, low, ...) 5-24%      H1= 0  H2= 0  H3= 0      Clinical assessments and measures completed:. CLARA-7, PHQ-9, CAGE-AID and PANSI      Strengths:Laney is a planful person and someone who likes to be around people and gets along well with them. She considers herself to be a good friend and values having fun.  Limitations:  Laney has a tendency to be hard on herself and over-analyzing/over-thinking. She is concerned with finances.  Cultural Considerations: Laney grew up in a small city in [place], a member of a large family where she learned to be resourceful and get along with everyone. She states, \"My family is very much in your business. That's how we are.\"       Persons responsible for this plan: Patient                 Psychotherapist Signature                                               Patient Signature:                                                                                                                                                             Guardian Signature                                                                                                                                                           Provider: Performed and documented by HAN Betancourt   Date:  12/20/2017  "

## 2021-06-14 NOTE — PROGRESS NOTES
"Mental Health Visit Note    12/20/17    Start time: 3:12 PM    Stop Time: 4:11 PM   Session # 11    Laney Flood is a 37 y.o. female is being seen today for    Chief Complaint   Patient presents with     Follow-up     Anxiety   .     New symptoms or complaints: None    Functional Impairment:   Personal: 4  Family: 4  Work: 3  Social:2    Clinical assessment of mental status:Laney presented on time for her appointment. She was oriented x3, open and cooperative, and dressed appropriately for this session and weather. Her memory was Normal cognitive functioning . Her speech was within normal. Language was appropriate to discussion. Concentration and focus is Within normal. Psychosis is not noted nor reported. Her mood is euthymic. Congruent w/content of speech. Affect is anxious. Fund of knowledge is adequate. Insight is adequate for therapy.     Suicidal/Homicidal Ideation present: None Reported This Session    Patient's impression of their current status: Patient discussed challenges with regard to her 's extended period of unemployment.  She acknowledged a feeling of \"alone-ness\" and pressure to support the household.  She considered the impact of his health problems, while she perceives some motivational and mental health issues as playing a larger role in his current period of unemployment.  She identified a tendency to feel responsible for his feelings which leads to a \"walking on egg shells.\"  She considered the conditions of a love relationship, specifically reciprocity, today.  She explored expectation of self and other.  She joined her Jewish choir recently, a source of great enjoyment.  She continues to author the values that are most meaningful in her life, such as marriage, family, and authenticity, and the behaviors that represent them.       Therapist impression of patients current state: Patient is engaged in the psychotherapeutic process. Processed thoughts, feelings, beliefs. She is " "willing to explore coping techniques, including thought defusion techniques and identifying 'unhelpful thoughts' and introducing alternative healthy behaviors in the face of them. She considers concepts of self that contribute to anxiety and is receptive to strategies for recognizing and changing self talk. She is very insightful and receptive to framework for learning to live in different relationship with anxiety while also willing to learn/apply CBT strategies. She is also receptive to assertive communication skills building within her marriage and is hoping to bring her  to her next session.      Type of psychotherapeutic technique provided: Insight oriented, Client centered, Solution-focused and CBT    Progress toward short term goals:Progress as expected, patient is applying techniques discussed in session to good benefit.    Review of long term goals: Treatment Plan updated    Diagnosis:   1. Generalized anxiety disorder        Plan and Follow up: Patient plans to continue to apply 3-2-1 strategy for reducing anxiety/panic symptoms, as needed, and to practice looking \"at thoughts rather than from thoughts\", applying defusion strategy to unhelpful thoughts. Patient plans to continue to use other ACT defusion techniques and some more breathing strategies (such as Square breathing, Yoga breathing). Plans to continue to nurture mindfulness exercise (15 minutes/day).  Plans to practice assertive communication and nurture healthy interpersonal boundaries in relationship with others. Plans to return in two weeks or as needed.      Discharge Criteria/Planning: Patient will continue with follow-up until therapy can be discontinued without return of signs and symptoms.    Melita Ramirez 12/20/2017      This note was created with help of Dragon dictation software. Grammatical / typing errors are not intentional and inherent to the software.  "

## 2021-06-15 NOTE — PROGRESS NOTES
Patient no show for follow up with this provider on 1/4/2018 at 3PM.  The undersigned contacted patient at number on file in Epic,[phone], and left discrete message requesting return call to discuss scheduling.    Melita Ramirez, ZACHSW

## 2021-06-19 NOTE — PROGRESS NOTES
Assessment & Plan   1. Sebaceous cyst:  Likely inflamed cyst or ingrowth hair, now almost completely resolved. No fluctuance noted.  Advised if this happens in the future to use moist heat compresses several times a day and return if it does not improve on its own.     2. Benign nevus:  Reassured benign appearing. She does have a physical later this month and will do a full body skin check at that time.     Almaz Giron CNP      Subjective   Chief Complaint:  Cyst (on the inner R thigh, noticed this about 2 weeks ago, started out painful but has since felt better, does get irritated with walking, is getting bigger)    HPI:   Laney Flood is a 38 y.o. female who presents for inner thigh mass.     She states two weeks ago she noted a bump in her right inner thigh, just below the buttocks.  Five days ago this began to enlarge and become painful.  She was seen at [facility] and told that it was a cyst but nothing was done.  She used warm packs and since that time it has receded in size. No drainage noted.  Still slightly painful.     Skin lesion: left cheek. Present for several months. No growth. Wondering if this could be related to acupuncture.  FMH of non-melanoma skin cancer in father.     Allergies:  has No Known Allergies.    Review of Systems:  A complete head to toe ROS is negative unless otherwise noted in HPI    Objective     Vitals:    08/16/18 1022   BP: 98/62   Patient Site: Left Arm   Patient Position: Sitting   Cuff Size: Adult Large   Pulse: 86   SpO2: 99%   Weight: 163 lb 8 oz (74.2 kg)       Physical Exam:  GENERAL: Alert, well appearing female  SKIN: Left inner thigh with 1cm slightly raised, round, well defined lesion.  Mild TTP. Mild erythema.  Left cheek with 3mm skin-colored papule, distinct borders, no scaling.

## 2021-06-20 NOTE — PROGRESS NOTES
"Mental Health Visit Note    8/29/2018    Start time: 4:13 PM    Stop Time: 5:12 PM   Session # 1    Session Type: Patient is presenting for an Individual session.    Laney Flood is a 38 y.o. female is being seen today for    Chief Complaint   Patient presents with     Follow-up     Anxiety   .     New symptoms or complaints: None    Functional Impairment:   Personal: 4  Family: 3  Work: 4  Social:2    Clinical assessment of mental status: Laney presented on time for her appointment.  She was oriented x3, open, cooperative, and dressed appropriately for this session and weather. Her memory was normal cognitive functioning.  Her speech was WNL.  Language was appropriate to discussion.  Concentration and focus is within normal.  Psychosis is not noted nor reported.  Her mood is anxious, congruent with content of speech.  Affect is euthymic. Fund of knowledge is adequate.  Insight is adequate for therapy.     Suicidal/Homicidal Ideation present: None Reported This Session    Patient's impression of their current status: Patient stated, \"I've been in my work role for 3 years this October. I'm trying to work through my anxiety,\" as she elaborated on the possibility of a position change in October.  She identified a \"toxic\" work environment where there is a lack of embracing of different work and learning styles.  She acknowledged that it has been helpful that her office has hired a consultant to improve the work culture and she noted that this person has offered her encouragement.  She also discussed relationship issues with her  and considered that it has been a positive that her  has been engaging in a work training program.  She explored expectation of self and other.  She reflected on the values that are most meaningful to her (rewarding work, achievement, personal growth, relationship, family, authenticity) and the behaviors that serve them.    Therapist impression of patients current state: " Patient is re-engaging in the therapy (last seen 12/20/17).  Her thoughts, feelings, and beliefs were processed. She is open to exploring coping techniques to improve symptom management.  She is willing to examine the relationship between her feelings, thoughts and behaviors, and engage in healthy behaviors in the presence of unhelpful thoughts or feelings.  She is receptive to examining concepts of self that contribute to anxiety and receptive to strategies to change negative self talk.  She is also receptive to nurturing assertive communication skills toward building greater intimacy in her marriage and enhance her professional relationships.  She is very insightful.     Type of psychotherapeutic technique provided: Insight oriented, Client centered, Solution-focused, CBT and ACT (Acceptance Commitment Therapy)    Progress toward short term goals:Progress as expected, patient is re-engaging in therapy and is receptive to techniques to improve symptom management.     Review of long term goals: Treatment Plan updated and patient completed updated psychological inventories.     Diagnosis:   1. Generalized anxiety disorder        Plan and Follow up: Patient plans to nurture healthy routines, including regular physical activity and a mindfulness practice.  She plans to apply grounding techniques in the presence of acute anxiety/panic symptoms.  She plans to apply ACT thought defusion techniques and relaxation breathing strategies in the presence of unhelpful thoughts. Plans to practice assertive communication and nurture healthy interpersonal boundaries with others.  Plans to return in 2 weeks.       Discharge Criteria/Planning: Patient will continue with follow-up until therapy can be discontinued without return of signs and symptoms.    Melita Ramirez 8/29/2018

## 2021-06-20 NOTE — PROGRESS NOTES
"Mental Health Visit Note    10/2/18    Start time: 5:12 PM    Stop Time: 6:07 PM   Session # 4    Session Type: Patient is presenting for an Individual session.    Laney Flood is a 38 y.o. female is being seen today for    Chief Complaint   Patient presents with     Follow-up     Anxiety     work stress   .     New symptoms or complaints: None    Functional Impairment:   Personal: 3  Family: 3  Work: 3  Social:2    Clinical assessment of mental status: Unchanged from 9/20/18 session: Laney presented on time for her appointment.  She was oriented x3, open, cooperative, and dressed appropriately for this session and weather. Her memory was normal cognitive functioning.  Her speech was WNL.  Language was appropriate to discussion.  Concentration and focus is within normal.  Psychosis is not noted nor reported.  Her mood is anxious, congruent with content of speech.  Affect is euthymic. Fund of knowledge is adequate.  Insight is adequate for therapy.     Suicidal/Homicidal Ideation present: None Reported This Session    Patient's impression of their current status: Patient stated, \"I'm good. I wasn't so good yesterday,\" as she elaborated on the impact of a stressful work situation. She considered some of the changes she is facing at her job.  She also discussed the ongoing health issues that her father is experiencing, and that her family received encouraging news this past week.  She reflected on the values that are most meaningful to her (rewarding work, achievement, personal growth, health, relationship, family, authenticity) and the behaviors that serve them.    Therapist impression of patients current state: Patient is engaging in the therapy.  Her thoughts, feelings, and beliefs were processed. She is open to exploring coping techniques to improve symptom management.  She continues to be willing to examine the relationship between her feelings, thoughts and behaviors, and engage in healthy behaviors in the " presence of unhelpful thoughts or feelings, a strategy that she is benefiting from, especially during stressful situations at work or with family relationships.  She remains receptive to examining concepts of self that contribute to anxiety and receptive to strategies to change negative self talk.  She is also receptive to nurturing assertive communication skills toward building greater intimacy in her marriage and enhancing her professional relationships, another therapeutic focus where she has found benefit. She is very insightful.     Type of psychotherapeutic technique provided: Insight oriented, Client centered, Solution-focused, CBT and ACT (Acceptance Commitment Therapy)    Progress toward short term goals:Progress as expected, patient is engaging in therapy and is receptive to techniques to improve symptom management.     Review of long term goals: Patient is making progress on her long-term goals. Last Treatment Update occured on 8/29/18.    Diagnosis:   1. Generalized anxiety disorder        Plan and Follow up: Patient plans to nurture healthy routines, including regular physical activity (enjoys a [employer] class) and a mindfulness practice.  She plans to apply grounding techniques in the presence of acute anxiety/panic symptoms.  She plans to apply ACT thought defusion techniques and relaxation breathing strategies in the presence of unhelpful thoughts. Plans to write in a journal as a release/outlet for her thoughts and feelings.  Plans to practice assertive communication and nurture healthy interpersonal boundaries with others. She plans to continue to participate in a choir, a source of enjoyment.  Plans to return in 2 weeks.       Discharge Criteria/Planning: Patient will continue with follow-up until therapy can be discontinued without return of signs and symptoms.    Melita Ramirez 10/3/2018

## 2021-06-20 NOTE — PROGRESS NOTES
Outpatient Mental Health Treatment Plan    Name:  Laney Flood  :  1980  MRN:  [id number]    Treatment Plan:  Updated Treatment Plan  Intake/initial treatment plan date:  17 (original). Updates: 17, 17 and 18  Benefit and risks and alternatives have been discussed: Yes  Is this treatment appropriate with minimal intrusion/restrictions: Yes  Estimated duration of treatment:  Approximately 6-8 sessions.  Anticipated frequency of services:  Every 1-2 weeks  Necessity for frequency: This frequency is needed to establish therapeutic goals and for continuity of care in order to monitor progress.  Necessity for treatment: To address cognitive, behavioral, and/or emotional barriers in order to work toward goals and to improve quality of life.    Session Type: Patient is presenting for an Individual session.    Plan:           ?   ? Anxiety    Goal:  Decrease average anxiety level from 3/4 to 2/4.   Strategies: ? [x]Learn and practice relaxation techniques and other coping strategies (e.g., thought stopping, reframing, meditation)     ? [x] Increase involvement in meaningful activities     ? [x] Discuss sleep hygiene     ? [x] Explore thoughts and expectations about self and others     ? [x] Identify and monitor triggers for panic/anxiety symptoms     ? [x] Implement physical activity routine (with physician approval)     ? [x] Consider introduction of bibliotherapy and/or videos     ? [x] Continue compliance with medical treatment plan (or explore barriers)                                           ?Degree to which this is a problem: 3  Degree to which goal is met: goal in progress.  Date of Review: in 3 months.       Functional Impairment:  1=Not at all/Rarely  2=Some days  3=Most Days  4=Every Day    Personal : 3  Family : 2  Social : 2   Work/school : 3    Diagnosis:  Generalized Anxiety disorder    WHODAS 2.0 12-item version 0      Scores presented in qualifiers to represent level of  disability.   NO Problem (none, absent, negligible,...) 0-4%    H1= 0  H2= 0  H3= 0      Clinical assessments and measures completed:. CLARA-7, PHQ-9, CAGE-AID and PANSI     Strengths:  Laney has many positive traits - she is intelligent, thoughtful, and caring toward others.  She has a strong work ethic and nurtures her marcia identity.  Limitations:  Laney has a tendency to be hard on herself.  Cultural Considerations: Laney grew up in [place] small city in [place], a member of a large family where she learned to be resourceful and get along with everyone.     Persons responsible for this plan: Patient            Psychotherapist Signature           Patient Signature:            Provider: Performed and documented by ZACH Betancourt   Date:  8/29/2018

## 2021-06-20 NOTE — PROGRESS NOTES
"Mental Health Visit Note    9/12/2018    Start time: 5:10 PM    Stop Time: 6:08 PM   Session # 2    Session Type: Patient is presenting for an Individual session.    Laney Flood is a 38 y.o. female is being seen today for    Chief Complaint   Patient presents with     Follow-up     Anxiety   .     New symptoms or complaints: Patient indicated she received acupuncture and chiropractic care by a provider in [place] and finds it very helpful.  She reported no new symptoms, nor concerns.    Functional Impairment:   Personal: 3  Family: 3  Work: 2  Social:2    Clinical assessment of mental status: Unchanged from 8/29/18 session: Laney presented on time for her appointment.  She was oriented x3, open, cooperative, and dressed appropriately for this session and weather. Her memory was normal cognitive functioning.  Her speech was WNL.  Language was appropriate to discussion.  Concentration and focus is within normal.  Psychosis is not noted nor reported.  Her mood is anxious, congruent with content of speech.  Affect is euthymic. Fund of knowledge is adequate.  Insight is adequate for therapy.     Suicidal/Homicidal Ideation present: None Reported This Session    Patient's impression of their current status: Patient stated, \"Things are pretty good. I get really hard on myself,\" as she elaborated work issues.  She acknowledged that she is managing stressful situations at work better.  She is hoping to find out that she can remain in her current position, a decision that will be made at the end of October.  She discussed her relationship with her  in the context of his health issues.  He has been engaging in a training program and appears to be managing his health better.  She described feeling concerned about recent health issues that her father has and the impact on her parents' relationship.  She resonated with these issues in her own life, and considered how she can be supportive to her parents.  She is " planning to connect with her sisters this evening in a phone call to identify ways they can collectively support their parents at this time.  She acknowledged she is looking forward to joining a choir next week, an activity which she enjoys. She reflected on the values that are most meaningful to her (rewarding work, achievement, personal growth, health, relationship, family, authenticity) and the behaviors that serve them.    Therapist impression of patients current state: Patient is re-engaging in the therapy.  Her thoughts, feelings, and beliefs were processed. She is open to exploring coping techniques to improve symptom management.  She is willing to examine the relationship between her feelings, thoughts and behaviors, and engage in healthy behaviors in the presence of unhelpful thoughts or feelings, a strategy that she is benefiting from, especially during stressful situations at work or with family relationships.  She is receptive to examining concepts of self that contribute to anxiety and receptive to strategies to change negative self talk.  She is also receptive to nurturing assertive communication skills toward building greater intimacy in her marriage and enhance her professional relationships, another therapeutic focus where she has found benefit. She is very insightful.     Type of psychotherapeutic technique provided: Insight oriented, Client centered, Solution-focused, CBT and ACT (Acceptance Commitment Therapy)    Progress toward short term goals:Progress as expected, patient is re-engaging in therapy and is receptive to techniques to improve symptom management.     Review of long term goals: Patient is making progress on her long-term goals.     Diagnosis:   1. Generalized anxiety disorder        Plan and Follow up: Patient plans to nurture healthy routines, including regular physical activity and a mindfulness practice.  She plans to apply grounding techniques in the presence of acute  anxiety/panic symptoms.  She plans to apply ACT thought defusion techniques and relaxation breathing strategies in the presence of unhelpful thoughts. Plans to practice assertive communication and nurture healthy interpersonal boundaries with others. She plans to join a choir next week.  Plans to return in 2-3 weeks.       Discharge Criteria/Planning: Patient will continue with follow-up until therapy can be discontinued without return of signs and symptoms.    Melita Ramirez 9/12/2018

## 2021-06-20 NOTE — PROGRESS NOTES
"Mental Health Visit Note    9/20/2018    Start time: 5:05 PM    Stop Time: 5:58 PM   Session # 3    Session Type: Patient is presenting for an Individual session.    Laney Flood is a 38 y.o. female is being seen today for    Chief Complaint   Patient presents with     Follow-up     Anxiety   .     New symptoms or complaints: Patient reported an increase in anxiety symptoms since last Thurday correlated with stressful work and family issues.      Functional Impairment:   Personal: 4  Family: 4  Work: 3  Social:2    Clinical assessment of mental status: Laney presented on time for her appointment.  She was oriented x3, open, cooperative, and dressed appropriately for this session and weather. Her memory was normal cognitive functioning.  Her speech was WNL.  Language was appropriate to discussion.  Concentration and focus is within normal.  Psychosis is not noted nor reported.  Her mood is anxious, congruent with content of speech.  Affect is euthymic. Fund of knowledge is adequate.  Insight is adequate for therapy.     Suicidal/Homicidal Ideation present: None Reported This Session    Patient's impression of their current status: Patient stated, \"There's been some restructuring at work,\" as she elaborated on occupational stressors.  She acknowledged feelings of ambivalence about some of the changes.  She also discussed the impact of her father's health issues on family dynamics.  She and her sisters are reaching out to her parents in various supportive ways.  She recognized similarities in some of the struggles her parents are facing.  She reflected on the values that are most meaningful to her (rewarding work, achievement, personal growth, health, relationship, family, authenticity) and the behaviors that serve them.    Therapist impression of patients current state: Patient is re-engaging in the therapy.  Her thoughts, feelings, and beliefs were processed. She is open to exploring coping techniques to improve " symptom management.  She continues to be willing to examine the relationship between her feelings, thoughts and behaviors, and engage in healthy behaviors in the presence of unhelpful thoughts or feelings, a strategy that she is benefiting from, especially during stressful situations at work or with family relationships.  She is receptive to examining concepts of self that contribute to anxiety and receptive to strategies to change negative self talk.  She is also receptive to nurturing assertive communication skills toward building greater intimacy in her marriage and enhance her professional relationships, another therapeutic focus where she has found benefit. She is very insightful.     Type of psychotherapeutic technique provided: Insight oriented, Client centered, Solution-focused, CBT and ACT (Acceptance Commitment Therapy)    Progress toward short term goals:Progress as expected, patient is re-engaging in therapy and is receptive to techniques to improve symptom management.     Review of long term goals: Patient is making progress on her long-term goals.     Diagnosis:   1. Generalized anxiety disorder        Plan and Follow up: Patient plans to nurture healthy routines, including regular physical activity and a mindfulness practice.  She plans to apply grounding techniques in the presence of acute anxiety/panic symptoms.  She plans to apply ACT thought defusion techniques and relaxation breathing strategies in the presence of unhelpful thoughts. Plans to write in a journal as a release/outlet for her thoughts and feelings.  Plans to practice assertive communication and nurture healthy interpersonal boundaries with others. She plans to continue to participate in a choir, a source of enjoyment.  Plans to return in 2 weeks.       Discharge Criteria/Planning: Patient will continue with follow-up until therapy can be discontinued without return of signs and symptoms.    Melita Ramirez 9/20/2018

## 2021-06-20 NOTE — PROGRESS NOTES
FEMALE PREVENTATIVE EXAM    Assessment and Plan:       1. Routine general medical examination at a health care facility      2. Screening for diabetes mellitus  - Glucose; Standing    3. Lipid screening  - Lipid Dawes FASTING; Standing     Also - We can consider oral antibiotics if skin cyst(s) recur    Next follow up:  No Follow-up on file.    Immunization Review  Adult Imm Review: Due today, orders placed  for influenza vaccine      I discussed the following with the patient:   Adult Healthy Living: Importance of regular exercise  Healthy nutrition  Stress management  Re joint pains - no red flags, intermittent - try making log of when they happen and asocciated day/activities  - chiropractic is helping, so of course that is fine       I have had an Advance Directives discussion with the patient.    Subjective:   Chief Complaint: Laney Flood is an 38 y.o. female here for a preventative health visit.     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 He had been totally healthy before this - this was a huge shock in their lives.  She continues to see Hyun to help with anxiety.    Laney has  Been having some joint issues - recently went to the chiropractor and it has been better.  Mainly left 2nd and 3rd MCP as well as some in shoulder.  Hasn't bothered her the last two says.  She does yoga, and hopes to have a more ergonomic setup at work.  Joint pains do not occur in the am.  This has been going on for a month.  She also sometimes feels  left arm/shoulder is tight and that sensation goes down arm.  She has had tingling in arm.  Chiropractor told to do some stretches.  She does not find herself weak on that side.  She has carried a back pack and makes sure she has it on both shoulders.    She also recently saw Almaz Giron for a cyst in her upper right inner thigh - Almaz Giron felt it would resolve on its own    Contraception: she Uses condoms for contraception.  If they have a kid it would be OK      Wt Readings from Last 3 Encounters:   09/07/18 163 lb (73.9 kg)   08/16/18 163 lb 8 oz (74.2 kg)   11/25/16 160 lb 9.6 oz (72.8 kg)           Healthy Habits  Are you taking a daily aspirin? No  Do you typically exercising at least 40 min, 3-4 times per week?  Yes walks on break, yoga for an hour, takes longer walk to bus, takes stairs  Do you usually eat at least 4 servings of fruit and vegetables a day, include whole grains and fiber and avoid regularly eating high fat foods? Yes - regular diet - trying to eat healthy - encourage farmers market  Have you had an eye exam in the past two years? NO  Do you see a dentist twice per year? Yes  Do you have any concerns regarding sleep? No    Safety Screen  If you own firearms, are they secured in a locked gun cabinet or with trigger locks? The patient does not own any firearms  Do you feel you are safe where you are living?: Yes (9/7/2018  1:17 PM)  Do you feel you are safe in your relationship(s)?: Yes (9/7/2018  1:17 PM)    Review of Systems:    Constitutional: negative for recent illness or change in weight  Eyes: negative for irritation and vision change  Ears, nose, mouth, throat, and face: negative for nasal congestion and sore throat  Respiratory: negative for cough and dyspnea on exertion  Cardiovascular: negative for chest pain and palpitations  Gastrointestinal: negative for abdominal pain and change in bowel habits  Genitourinary:negative for dysuria, frequency and hesitancy  Integument/breast:  "negative for rash  Hematologic/lymphatic: negative for bleeding and easy bruising  Musculoskeletal:negative for myalgias; positive arthralgias noted above  Neurological: negative for dizziness, headaches and paresthesia        Cancer Screening       Status Date      PAP SMEAR Next Due 3/26/2020      Done 3/26/2015           Patient Care Team:  Christiana Beach MD as PCP - General (Family Medicine)        History     Reviewed By Date/Time Sections Reviewed    Juan Antonio NEMESIO Romo, [facility] 9/7/2018  1:22 PM Tobacco    Juan Antonio Romo, [facility] 9/7/2018  1:17 PM Tobacco            Objective:   Vital Signs:   Visit Vitals     /64 (Patient Site: Right Arm)     Temp 97.9  F (36.6  C) (Oral)     Resp 12     Ht 5' 6.75\" (1.695 m)     Wt 163 lb (73.9 kg)     LMP 08/15/2018 (Exact Date)     BMI 25.72 kg/m2          PHYSICAL EXAM  General appearance - alert, well appearing, and in no distress  Mental status - normal mood, behavior, speech, dress, motor activity, and thought processes  Eyes - pupils equal and reactive, extraocular eye movements intact, funduscopic exam normal, discs flat and sharp  Ears - bilateral TM's and external ear canals normal  Mouth - mucous membranes moist, pharynx normal without lesions  Neck - supple, no significant adenopathy, carotids upstroke normal bilaterally, no bruits, thyroid exam: thyroid is normal in size without nodules or tenderness  Chest - clear to auscultation, no wheezes, rales or rhonchi, symmetric air entry  Heart - normal rate, regular rhythm, normal S1, S2, no murmurs, rubs, clicks or gallops  Abdomen - soft, nontender, nondistended, no masses or organomegaly  Breasts - breasts appear normal, no suspicious masses, no skin or nipple changes or axillary nodes  Neurological - DTR's normal and symmetric  Extremities - peripheral pulses normal, no pedal edema, no clubbing or cyanosis.  Phalen's and Tnnel's tests are negative  Skin - no rashes or worrisome lesions; healing large papule upper inner " right thigh

## 2021-06-22 NOTE — PROGRESS NOTES
"Mental Health Visit Note    12/19/18    Start time: 5:12 PM    Stop Time: 6:10 PM   Session # 5    Session Type: Patient is presenting for an Individual session.    Laney Flood is a 38 y.o. female is being seen today for    Chief Complaint   Patient presents with     Follow-up     Anxiety     relationship issues, job stress     Panic Attack     expectation on self issues   .     New symptoms or complaints: Patient endorsed heightened anxiety over the last week and a half, and 3 panic episodes where she described a \"prickly feeling\" on her neck, accelerated heart rate, tightness in chest, and shortness of breath.    Functional Impairment:   Personal: 3  Family: 3  Work: 3  Social:2    Clinical assessment of mental status: Laney presented on time for her appointment.  She was oriented x3, open, cooperative, and dressed appropriately with good grooming and hygiene for this session and weather. Her memory was normal cognitive functioning.  Her speech was WNL.  Language was appropriate to discussion.  Concentration and focus is within normal.  Psychosis is not noted nor reported.  Her mood is mildly anxious.  Affect is euthymic. Fund of knowledge is adequate.  Insight is adequate for therapy.     Suicidal/Homicidal Ideation present: None Reported This Session    Patient's impression of their current status: Patient discussed recently heightened anxiety and three panic episodes that she navigated through successfully.  She stated, \"I caught myself not breathing. I get myself worked up. I put too much pressure on myself,\" as she elaborated on the situations that correlated with acute anxiety.  She acknowledged that a primary source of stress continues to be her 's prolonged job search/training since his healing from a kidney transplant.  She discussed communication issues in her marriage.  She reflected that this tends to be a challenging time of year due to the many social gatherings where people often ask " about her 's job search status.  She also discussed adjustment to a new job role and whether it is a good fit for her.  She reflected on the values that are most meaningful to her (marriage, family, rewarding work, personal growth, health, authenticity) and the behaviors that serve them.    Therapist impression of patients current state: Patient is engaging in the therapy.  Her thoughts, feelings, and beliefs were processed. She is open to exploring coping techniques to improve symptom management, including mindfulness breathing techniques to help her navigate moments of acute anxiety.  She continues to be willing to examine the relationship between her feelings, thoughts and behaviors, and engage in healthy behaviors in the presence of unhelpful thoughts or feelings, a strategy that she is benefiting from, especially during stressful situations at work or with family relationships.  She remains receptive to examining concepts of self that contribute to anxiety and receptive to strategies to change negative self talk.  She is also receptive to nurturing assertive communication skills toward building greater intimacy in her marriage and enhancing her professional relationships, another therapeutic focus where she has found benefit. She is very insightful.     Type of psychotherapeutic technique provided: Insight oriented, Client centered, Solution-focused, CBT and ACT (Acceptance Commitment Therapy)    Progress toward short term goals:Progress as expected, patient is engaging in therapy and is receptive to techniques to improve symptom management.     Review of long term goals: Treatment Plan updated    Diagnosis:   1. Generalized anxiety disorder        Plan and Follow up: Patient plans to nurture healthy routines, including regular physical activity (enjoys a Anshul class) and a mindfulness practice.  She plans to apply grounding techniques in the presence of acute anxiety/panic symptoms (3-2-1 Grounding  technique, Square breathing).  She plans to apply ACT thought defusion techniques and relaxation breathing strategies in the presence of unhelpful thoughts. Plans to try using Head Space, a mindfulness diana.  Plans to practice assertive communication and nurture healthy interpersonal boundaries with others. She plans to continue to participate in a choir, a source of enjoyment.  Plans to return in 2-3 weeks.       Discharge Criteria/Planning: Patient will continue with follow-up until therapy can be discontinued without return of signs and symptoms.    Melita Ramirez 12/19/2018

## 2021-06-22 NOTE — PROGRESS NOTES
Outpatient Mental Health Treatment Plan     Name:  Laney Flood  :  1980  MRN:  [id number]     Treatment Plan:  Updated Treatment Plan  Intake/initial treatment plan date:  17  Benefit and risks and alternatives have been discussed: Yes  Is this treatment appropriate with minimal intrusion/restrictions: Yes  Estimated duration of treatment:  Approximately 6-8 sessions.  Anticipated frequency of services:  Every 1-2 weeks  Necessity for frequency: This frequency is needed to establish therapeutic goals and for continuity of care in order to monitor progress.  Necessity for treatment: To address cognitive, behavioral, and/or emotional barriers in order to work toward goals and to improve quality of life.     Session Type: Patient is presenting for an Individual session.     Plan:                                                                              ?              ? Anxiety                        Goal:    Decrease average anxiety level from 3/4 to 2/4.              Strategies:       ? [x]Learn and practice relaxation techniques and other coping strategies (e.g., thought stopping, reframing, meditation)                                      ? [x] Increase involvement in meaningful activities                                      ? [x] Discuss sleep hygiene                                      ? [x] Explore thoughts and expectations about self and others                                      ? [x] Identify and monitor triggers for panic/anxiety symptoms                                      ? [x] Implement physical activity routine (with physician approval)                                      ? [x] Consider introduction of bibliotherapy and/or videos                                      ? [x] Continue compliance with medical treatment plan (or explore barriers)                                                     ?Degree to which this is a problem: 3  Degree to which goal is met: goal in progress.  Date  of Review: in 3 months.                                        Functional Impairment:  1=Not at all/Rarely  2=Some days  3=Most Days  4=Every Day    Personal : 3  Family : 2  Social : 2   Work/school : 3     Diagnosis:  Generalized Anxiety disorder     WHODAS 2.0 12-item version 0       Scores presented in qualifiers to represent level of disability.   NO Problem (none, absent, negligible,...) 0-4%     H1= 0  H2= 0  H3= 0        Clinical assessments and measures completed:. CLARA-7, PHQ-9, CAGE-AID and CSSR-S     Strengths:  Laney has so many positive traits - she is intelligent, thoughtful, and caring toward others.  She has a strong work ethic and nurtures her marcia identity.  Limitations:  Laney has a tendency to be hard on herself.  Cultural Considerations: Laney grew up in a small city in [place], a member of a large family where she learned to be resourceful and get along with everyone.      Persons responsible for this plan: Patient                 Psychotherapist Signature                                                             Patient Signature:                                                                                      Provider: Performed and documented by HAN Betancourt   Date:  12/19/2018

## 2021-10-02 ENCOUNTER — HEALTH MAINTENANCE LETTER (OUTPATIENT)
Age: 41
End: 2021-10-02

## 2022-03-01 ENCOUNTER — OFFICE VISIT (OUTPATIENT)
Dept: FAMILY MEDICINE | Facility: CLINIC | Age: 42
End: 2022-03-01
Payer: COMMERCIAL

## 2022-03-01 ENCOUNTER — PATIENT OUTREACH (OUTPATIENT)
Dept: ONCOLOGY | Facility: CLINIC | Age: 42
End: 2022-03-01

## 2022-03-01 VITALS
HEIGHT: 67 IN | DIASTOLIC BLOOD PRESSURE: 62 MMHG | OXYGEN SATURATION: 100 % | BODY MASS INDEX: 25.88 KG/M2 | SYSTOLIC BLOOD PRESSURE: 104 MMHG | HEART RATE: 88 BPM | WEIGHT: 164.9 LBS

## 2022-03-01 DIAGNOSIS — Z11.59 NEED FOR HEPATITIS C SCREENING TEST: ICD-10-CM

## 2022-03-01 DIAGNOSIS — L98.9 BENIGN SKIN LESION: ICD-10-CM

## 2022-03-01 DIAGNOSIS — S76.019A: ICD-10-CM

## 2022-03-01 DIAGNOSIS — Z13.220 LIPID SCREENING: ICD-10-CM

## 2022-03-01 DIAGNOSIS — Z00.00 ANNUAL PHYSICAL EXAM: Primary | ICD-10-CM

## 2022-03-01 DIAGNOSIS — Z80.3 FAMILY HISTORY OF MALIGNANT NEOPLASM OF BREAST: ICD-10-CM

## 2022-03-01 LAB
CHOLEST SERPL-MCNC: 189 MG/DL
FASTING STATUS PATIENT QL REPORTED: YES
HCV AB SERPL QL IA: NONREACTIVE
HDLC SERPL-MCNC: 64 MG/DL
LDLC SERPL CALC-MCNC: 112 MG/DL
TRIGL SERPL-MCNC: 66 MG/DL

## 2022-03-01 PROCEDURE — 80061 LIPID PANEL: CPT | Performed by: FAMILY MEDICINE

## 2022-03-01 PROCEDURE — 99213 OFFICE O/P EST LOW 20 MIN: CPT | Mod: 25 | Performed by: FAMILY MEDICINE

## 2022-03-01 PROCEDURE — 36415 COLL VENOUS BLD VENIPUNCTURE: CPT | Performed by: FAMILY MEDICINE

## 2022-03-01 PROCEDURE — 99395 PREV VISIT EST AGE 18-39: CPT | Performed by: FAMILY MEDICINE

## 2022-03-01 PROCEDURE — 86803 HEPATITIS C AB TEST: CPT | Performed by: FAMILY MEDICINE

## 2022-03-01 NOTE — PATIENT INSTRUCTIONS
Comment: Please be aware that coverage of these services is subject to the terms and limitations of your health insurance plan.  Call member services at your health plan with any benefit or coverage questions.   If you have not heard from the scheduling office within 2 business days, please call [phone] for TRICIA Murdock, [phone] for Margie and [phone] for [place].       ---  Exercise basics for everyone    Most important - find something you like!  I cannot stress this enough. But if you don't like anything, just try an online exercise video and give it a couple of weeks on marcia    If you have not been exercising at all, understand that you will have a bit of an  up hill  before it feels good.  Have marcia in the process - you will start to feel good after exercising and you will start feeling better overall.    If you are working at a seated job at whom or on site, setting several alarms for 10 minute exercise breaks can help you feel better and break up the monotony    Set reasonable goals - even 10 minutes a day.  Set an alarm when you know it will be a good time to exercise.  If you miss a day, or week, or even a month or two - just get back to it when you can.  No self-shaming    Start out with a short amount of time and gradually increase it    If you are working with hand held weights, start low weight (2-5 pounds, or a large soup cans, or a large water bottle) for a short amount of time, and gradually increase weight    If you  feel short of breath or too tired, stop.  If you have ongoing shortness of breath or chest pain. Stop exercising entirely (other than day to day activities) and make and appointment to be evaluated    Include some gentle stretching after you have warmed up a bit, or at the end of your exercise    EXERCISE RESOURCES  From the American Diabetes Association, but good advice for everyone  https://www.diabetes.org/healthy-living/fitness    From American Heart  Association, with links to many different topics  https://www.heart.org/en/healthy-living/fitness/fitness-basics    How to get started - from American Academy of Family Physicians:  https://www.aafp.org/afp/2006/1215/p2095.html    Exercising with Diabetes  https://www.diabetes.org/healthy-living/fitness/xnsadhe-epofldh-ddpvgu/exercising-diabetes-complications    5 walking strategies from the American Arthritis Foundation:  https://www.arthritis.org/health-wellness/healthy-living/physical-activity/walking/0-drpkoro-jkdlplvvuq    Exercise routines you can access from home:  If you have a medicare advantage plan, explore your options through [employer] https://www.medicare.org/articles/what-is-the-[employer]-program/    Here are some Silver Sneakers exercises online  https://www.AirCast Mobileube.com/watch?v=G5xhRDEGh01  https://www.AirCast Mobileube.com/watch?v=1zEIzOeL97S  (and more come up for you to explore)    Chair Yoga:  https://www.AirCast Mobileube.com/watch?v=4khFh-syE00    Other work outs  I used to really like sparkpeople.com for free exercise videos of various types.  Sadly, this web site is closed, however  I have found that videos are still available on [employer]ee[employer] (at least for the moment) , and that once you pull up one video, other choices appear on the right sidebar. Here are several (you can look up on  ShopIgniterube if hyperlink does not work)  12-Minute Seated Core Workout Video  SparkPeople  10-Minute Crunchless Core Workout Video  SparkPeople  18-Minute Boot Camp Cardio Sculpt Video  SparkPeople  SparkPeople Swimsuit Bootcamp - Day 3 Workout - YouTube    JOSLYN - simple dance routines that provide exercise - there are a TON of joslyn videos on line.  I would suggest that you pick a song you like and do a search on that song and Joslyn  Here is an example  Me Marco Antonio Landers   Chorelorrie Villavicencio - ZoweeTV[employer]ust    [facility]

## 2022-03-01 NOTE — PROGRESS NOTES
"    ASSESSMENT/PLAN:   Laney was seen today for physical.    Diagnoses and all orders for this visit:    Annual physical exam    Strain of buttock, unspecified laterality, initial encounter  -     Physical Therapy Referral    Benign skin lesion  -     Adult Dermatology Referral; Future    Family history of malignant neoplasm of breast  -     Cancer Risk Mgmt/Cancer Genetic Counseling Referral; Future    Lipid screening  -     Lipid Profile (Chol, Trig, HDL, LDL calc)    Need for hepatitis C screening test  -     Hepatitis C Screen Reflex to HCV RNA Quant and Genotype    Other orders  -     REVIEW OF HEALTH MAINTENANCE PROTOCOL ORDERS      COUNSELING:  Reviewed preventive health counseling, as reflected in patient instructions       Regular exercise       Healthy diet/nutrition       Consider Hep C screening for all patients one time for ages 18-79 years    Estimated body mass index is 26.02 kg/m  as calculated from the following:    Height as of this encounter: 1.695 m (5' 6.75\").    Weight as of this encounter: 74.8 kg (164 lb 14.4 oz).        She reports that she has never smoked. She has never used smokeless tobacco.    Return in about 1 year (around 3/1/2023) for Routine preventive, or earlier as needed.\  Christiana Beach MD  [facility]     SUBJECTIVE:   CC: Laney Flood is an 41 year old woman who presents for preventive health visit.       Patient has been advised of split billing requirements and indicates understanding: Yes  Healthy Habits:     Getting at least 3 servings of Calcium per day:  NO    Bi-annual eye exam:  NO    Dental care twice a year:  Yes    Sleep apnea or symptoms of sleep apnea:  None    Diet:  Regular (no restrictions)    Frequency of exercise:  6-7 days/week    Duration of exercise:  15-30 minutes    Taking medications regularly:  Yes    Medication side effects:  Not applicable    PHQ-2 Total Score: 0    Additional concerns today:  Yes    Diet   " - mostly good. Her  (unemployed at the moment) does the cooking  Exercise - does classical stretch - Betty Pradhan.  Feels she could get more exercise - this will improve as weather gets better    Hurt back around [place] - was walking quickly around neighborhood and slipped and almost but did not  fall. Then she thought she had healed from thi, however in the shower - reach across to lower leg and pain came back -was really laid up, ice, massages chiropractor. Pain gets better then comes back. Notices mostly on right - deep inside but muscle - sometimes or left . Stretching improves this    Spot on left cheek - has been there for a while and it has changed slightly. Dad and both sisters have had basal cell cancer      Social History: Laney is  and live with her  in an apartment [place].  She works for the [employer]  - remote at work mostly  But will be going back in    Today's PHQ-2 Score:   PHQ-2 ( 1999 [employer]) 2/28/2022   Q1: Little interest or pleasure in doing things 0   Q2: Feeling down, depressed or hopeless 0   PHQ-2 Score 0   Q1: Little interest or pleasure in doing things Not at all   Q2: Feeling down, depressed or hopeless Not at all   PHQ-2 Score 0       Abuse: Current or Past (Physical, Sexual or Emotional) - No  Do you feel safe in your environment? Yes        Social History     Tobacco Use     Smoking status: Never Smoker     Smokeless tobacco: Never Used   Substance Use Topics     Alcohol use: Yes         Alcohol Use 2/28/2022   Prescreen: >3 drinks/day or >7 drinks/week? No   Prescreen: >3 drinks/day or >7 drinks/week? -       Reviewed orders with patient.  Reviewed health maintenance and updated orders accordingly - Yes      Breast Cancer Screening:    FHS-7:   Breast CA Risk Assessment (FHS-7) 2/28/2022   Did any of your first-degree relatives have breast or ovarian cancer? No   Did any of your relatives have bilateral breast cancer? Yes   Did any  man in your family have breast cancer? No   Did any woman in your family have breast and ovarian cancer? No   Did any woman in your family have breast cancer before age 50 y? No   Do you have 2 or more relatives with breast and/or ovarian cancer? No   Do you have 2 or more relatives with breast and/or bowel cancer? No       Grandma had bilateral breast cancer - Mom's side - discussed this is a higher risk.  She was able to call Mom - Mom was never tested for cancer risk, encouraged Laney to do this    History of abnormal Pap smear: NO - age 30-65 PAP every 5 years with negative HPV co-testing recommended  PAP / HPV Latest Ref Rng & Units 8/25/2020 3/26/2015   PAP Negative for squamous intraepithelial lesion or malignancy. Negative for squamous intraepithelial lesion or malignancy  Electronically signed by Laney Spence CT (ASCP) on 9/4/2020 at  8:56 AM   -   PAP (Historical) - - NIL   HPV16 NEG Negative Negative   HPV18 NEG Negative Negative   HRHPV NEG Negative Negative     Reviewed and updated as needed this visit by clinical staff   Tobacco  Allergies  Meds              Reviewed and updated as needed this visit by Provider                     Review of Systems  Constitutional: negative for recent illness or change in weight  Eyes: negative for irritation and vision change  Ears, nose, mouth, throat, and face: negative for nasal congestion and sore throat  Respiratory: negative for cough and dyspnea on exertion  Cardiovascular: negative for chest pain and palpitations  Gastrointestinal: negative for abdominal pain and change in bowel habits  Genitourinary:negative for dysuria, frequency and hesitancy  rash  Hematologic/lymphatic: negative for bleeding and easy bruising  Musculoskeletal:negative for arthralgias and myalgias  Neurological: negative for dizziness, headaches and paresthesia       OBJECTIVE:   /62 (BP Location: Left arm, Patient Position: Sitting, Cuff Size: Adult Regular)   Pulse 88   " Ht 1.695 m (5' 6.75\")   Wt 74.8 kg (164 lb 14.4 oz)   SpO2 100%   BMI 26.02 kg/m    Physical Exam  General appearance - alert, well appearing, and in no distress  Mental status - normal mood, behavior, speech, dress, motor activity, and thought processes  Eyes - pupils equal and reactive, extraocular eye movements intact, funduscopic exam normal, discs flat and sharp  Ears - bilateral TM's and external ear canals normal  Mouth - mucous membranes moist, pharynx normal without lesions  Neck - supple, no significant adenopathy, carotids upstroke normal bilaterally, no bruits, thyroid exam: thyroid is normal in size without nodules or tenderness  Chest - clear to auscultation, no wheezes, rales or rhonchi, symmetric air entry  Heart - normal rate, regular rhythm, normal S1, S2, no murmurs, rubs, clicks or gallops  Abdomen - soft, nontender, nondistended, no masses or organomegaly  Breasts - breasts appear normal, no suspicious masses, no skin or nipple changes or axillary nodes  Neurological - alert, oriented, normal speech, no focal findings or movement disorder noted, DTR's normal and symmetric  Extremities - peripheral pulses normal, no pedal edema, no clubbing or cyanosis  Skin - no rashes ; pale papule with very slight central depression left cheek    "

## 2022-03-17 ENCOUNTER — THERAPY VISIT (OUTPATIENT)
Dept: PHYSICAL THERAPY | Facility: CLINIC | Age: 42
End: 2022-03-17
Payer: COMMERCIAL

## 2022-03-17 DIAGNOSIS — M25.551 HIP PAIN, RIGHT: Primary | ICD-10-CM

## 2022-03-17 PROCEDURE — 97161 PT EVAL LOW COMPLEX 20 MIN: CPT | Mod: GP | Performed by: PHYSICAL THERAPIST

## 2022-03-17 PROCEDURE — 97110 THERAPEUTIC EXERCISES: CPT | Mod: GP | Performed by: PHYSICAL THERAPIST

## 2022-03-17 NOTE — PROGRESS NOTES
[facility] Physical Therapy Initial Evaluation  3/17/2022     Precautions/Restrictions/MD instructions: eval and treat    Therapist Assessment: Laney Flood is a 41 year old female patient presenting to Physical Therapy with R and L glute pain. Patient demonstrates decreased strength bilateral (R>L) hip extensors, hip abductors and hip external rotators, full bilateral hip and lumbar AROM, TTP along glute med muscle belly and tendon on R, painful hip ABD activation on R, + ANDREW on R. Signs and symptoms are consistent with R glute med tendinopathy. These impairments limit their ability to walk, stretch, sit without pain. Skilled PT services are necessary in order to reduce impairments and improve independent function.    Subjective History    Injury/Condition Details:  Presenting Complaint R and L glute pain   Onset Timing/Date December original injury, flared in February, (Doctor's referral 3/1/2022)   Mechanism Slipped in December and almost fell   February reached in shower and felt strain      Symptom Behavior Details    Primary Symptoms Constant symptoms; worsen with activity, pain (Location: R glute med and L glute med, Quality: Aching/Throbbing and Tender)     Denies locking, catching, giving way, or instability. Denies numbness, tingling, changes in sensation. Denies having related symptoms spreading to the R post thigh, R calf, L post thigh and L calf.    Worst Pain 7/10 (with injury in shower)   Symptom Provocators Sitting, stretching, reaching down    Best Pain 0/10    Symptom Relievers Stretching, chiropractor    Time of day dependent? Worse in evening after activity   Recent symptom change? no change in symptoms     Prior Testing/Intervention for current condition:  Prior Tests None   Prior Treatment medication     Lifestyle & General Medical History:  Employment Works for [employer]    Usual physical activities  (within past year) Hiking, walking,  elieser jorgensen    Orthopaedic History  None reported by patient   Medication  None reported by patient    Notable medical history None reported by patient    Patient goals Decrease pain with walking, sitting    Patient Reported Health good   Red Flags: (Bold when present) - reviewed the following and denies  Malaise, unexplained weight loss, night pain, fever    Answers for HPI/ROS submitted by the patient on 3/16/2022  Reason for Visit:: strain of glutes, lower back  When problem began:: 12/12/2021  How problem occurred:: Mid December I slipped and almost fell, thought I had healed, on 12/25 while in shower, reached for lower leg and pain came back, was really laid up, for a couple of weeks. Pain gets better, than comes back  Number scale: 2/10  General health as reported by patient: good  Please check all that apply to your current or past medical history: none  Medical allergies: none  Surgeries: none  Medications you are currently taking: none  What are your primary job tasks: computer work, prolonged sitting      Hip Physical Therapy Examination    Dynamic Movement Screen:  2 leg stance: internal rotation of patellas   2 leg squat: Anterior knee translation, Knee valgus, Hip internal rotation and Improper use of glutes/hips    1 leg stance:   Right: proprioceptive challenge and excessive contralateral pelvic drop  Left: proprioceptive challenge and excessive contralateral pelvic drop    1 leg squat:   Right: proprioceptive challenge, excessive contralateral pelvic drop, excessive femoral IR/ADD and excessive anterior knee excursion + pain  Left: proprioceptive challenge, excessive contralateral pelvic drop, excessive femoral IR/ADD and excessive anterior knee excursion    Gait: Trendelenburg R              Hip Joint ROM   Flex (120) Ext (30) ER (45) IR (45) ABD (45) ADD (30)   Right 130 deg 30 deg 45 deg 45 deg 45 deg 30 deg   Left 130 deg 30 deg 45 deg 45 deg 45 deg 40 deg   (*Indicates patient s pain)    Lower  Extremity Muscle Strength (x/5)   Hip IR Hip ER Knee Ext Hip ABD Hip Ext Knee Flex   Right  4/5 4-/5 4/5 3/5 3+/5 4/5   Left 4/5 4/5 4/5 3+/5 4-/5 4/5   (*Indicates patient s pain)      SI Tests  Right Left   SI compression (-) (-)   SI distraction (-) (-)   SI Thigh Thrust (-) (-)   Active SLR (-) (-)     Lumbar Screen:    L R   Slump (-) (-)   SLR (-) (-)     Basic Muscle Activation:  Transversus Abdominus: fair  Quadriceps: Right: good, Left: good  Gluteal: Right: fair, Left: good     Lower Extremity Flexibility Screen:  Hamstrings (Supine SLR): Right: SLR past 90; Left: SLR past 90   Gastroc (Supine Active DF): Right: -; Left: -  Quadriceps (Prone KF): Right: -; Left: -  Hip Flexors (Herman Test): Right: -; Left: -  ITB (Isela Test): Right: -; Left: -    Hip Special Testing:  Test Right Left   FADDIR (-) (-)   ANDREW (++) (-)   Log Roll (ER) (-) (-)   Scour (-) (-)   Resisted SLR (-) (-)     Resisted Movement Testing:  Test Right Left   Adduction Squeeze (-) (-)   Psoas (-) (-)   TFL (-) (-)   ABD (-) (-)     Hip Palpation:  Tender to palpation at: gluteus medius muscle belly and gluteus medius tendon insertion      Assessment/Plan:    The patient is a 41 year old female with chief complaint of R and L glute pain.    The patient has the following significant findings with corresponding treatment plan.  Diagnosis 1:  Signs and symptoms consistent with R glute med tendinopathy    Pain -  hot/cold therapy, manual therapy, splint/taping/bracing/orthotics, self management, education and home program  Decreased ROM/flexibility - manual therapy, therapeutic exercise and home program  Decreased joint mobility - manual therapy, therapeutic exercise and home program  Decreased strength - therapeutic exercise, therapeutic activities and home program  Impaired balance - neuro re-education, therapeutic activities and home program  Decreased proprioception - neuro re-education and therapeutic activities  Impaired gait - gait  training and assistive devices  Impaired muscle performance - neuro re-education and home program  Decreased function - therapeutic activities and home program  Impaired posture - neuro re-education, therapeutic activities and home program  Instability -  Therapeutic Activity, Therapeutic Exercise, Neuromuscular Re-education, Splinting/Taping/Bracing/Orthotic, home program    Therapy Evaluation Codes:   1) History comprised of:   Personal factors that impact the plan of care:      None.    Comorbidity factors that impact the plan of care are:      None.     Medications impacting care: None.  2) Examination of Body Systems comprised of:   Body structures and functions that impact the plan of care:      Hip.   Activity limitations that impact the plan of care are:      Lifting, Sitting and Walking.  3) Clinical presentation characteristics are:   Stable/Uncomplicated.  4) Decision-Making    Low complexity using standardized patient assessment instrument and/or measureable assessment of functional outcome.  Cumulative Therapy Evaluation is: Low complexity.    Previous and current functional limitations:  (See Goal Flow Sheet for this information)    Short term and Long term goals: (See Goal Flow Sheet for this information)     Communication ability:  Patient appears to be able to clearly communicate and understand verbal and written communication and follow directions correctly.  Treatment Explanation - The following has been discussed with the patient:   RX ordered/plan of care  Anticipated outcomes  Possible risks and side effects  This patient would benefit from PT intervention to resume normal activities.   Rehab potential is good.    Frequency:  1 X week, once daily  Duration:  for 6 visits  Discharge Plan: Achieve all LTGs, be independent in home treatment program, and reach maximal therapeutic benefit.    Please refer to the daily flowsheet for treatment today, total treatment time and time spent performing 1:1  timed codes.

## 2022-04-07 ENCOUNTER — THERAPY VISIT (OUTPATIENT)
Dept: PHYSICAL THERAPY | Facility: CLINIC | Age: 42
End: 2022-04-07
Payer: COMMERCIAL

## 2022-04-07 DIAGNOSIS — M25.551 HIP PAIN, RIGHT: ICD-10-CM

## 2022-04-07 PROCEDURE — 97140 MANUAL THERAPY 1/> REGIONS: CPT | Mod: GP | Performed by: PHYSICAL THERAPIST

## 2022-04-07 PROCEDURE — 97110 THERAPEUTIC EXERCISES: CPT | Mod: GP | Performed by: PHYSICAL THERAPIST

## 2022-04-12 ENCOUNTER — TRANSFERRED RECORDS (OUTPATIENT)
Dept: HEALTH INFORMATION MANAGEMENT | Facility: CLINIC | Age: 42
End: 2022-04-12
Payer: COMMERCIAL

## 2022-04-21 ENCOUNTER — THERAPY VISIT (OUTPATIENT)
Dept: PHYSICAL THERAPY | Facility: CLINIC | Age: 42
End: 2022-04-21
Payer: COMMERCIAL

## 2022-04-21 DIAGNOSIS — M25.551 HIP PAIN, RIGHT: Primary | ICD-10-CM

## 2022-04-21 PROCEDURE — 97112 NEUROMUSCULAR REEDUCATION: CPT | Mod: GP | Performed by: PHYSICAL THERAPIST

## 2022-04-21 PROCEDURE — 97110 THERAPEUTIC EXERCISES: CPT | Mod: GP | Performed by: PHYSICAL THERAPIST

## 2022-05-17 ENCOUNTER — VIRTUAL VISIT (OUTPATIENT)
Dept: ONCOLOGY | Facility: CLINIC | Age: 42
End: 2022-05-17
Attending: FAMILY MEDICINE
Payer: COMMERCIAL

## 2022-05-17 DIAGNOSIS — Z80.3 FAMILY HISTORY OF MALIGNANT NEOPLASM OF BREAST: ICD-10-CM

## 2022-05-17 PROCEDURE — 96040 HC GENETIC COUNSELING, EACH 30 MINUTES: CPT | Mod: GT,95 | Performed by: GENETIC COUNSELOR, MS

## 2022-05-17 NOTE — PROGRESS NOTES
5/17/2022    Referring Provider: Christiana Beach MD    Presenting Information:   I met with Laney Flood today for genetic counseling at the Cancer Risk Management Program (virtual visit) to discuss her family history of breast cancer.  She is here today to review this history, cancer screening recommendations, and available genetic testing options.    Personal History:  Laney is a 41 year old female. She does not have any personal history of cancer.  She had her first menstrual period at age 15, and she has her ovaries, fallopian tubes and uterus in place.     Family History: (Please see scanned pedigree for detailed family history information)    Lanye's maternal grandmother was diagnosed with breast cancer in her late 50's, and a second breast cancer in her 60's-70's (reported as a second primary).  She also had a history of small cell lung cancer, and passed away at age 88    One maternal aunt was diagnosed recently with breast cancer at age 67    One maternal uncle passed away at age 30, and was diagnosed with either melanoma or sarcoma at age 24    Laney has a maternal first cousin whose son had a diagnosis of neuroblastoma    Her father is in his 70's and has had basal cell carcinoma    Her ethnicity is [place]/English.      Discussion:    Laney's family history of breast cancer may be suggestive of a hereditary cancer syndrome.    We reviewed the features of sporadic, familial, and hereditary cancers.  Based on her family history of three instances of breast cancer, Laney meets current National Comprehensive Cancer Network (NCCN) criteria for genetic testing of high penetrance breast cancer susceptibility genes (including BRCA1, BRCA2, among others).      We discussed the natural history and genetics of hereditary breast and ovarian cancer. A detailed handout regarding hereditary breast cancer and the information we discussed can be found in the after visit summary. Topics included: inheritance  pattern, cancer risks, cancer screening recommendations, and also risks, benefits and limitations of testing.    We discussed that there are additional genes that could cause increased risk for breast, and/or other cancers as well. As many of these genes present with overlapping features in a family and accurate cancer risk cannot always be established based upon the pedigree analysis alone, it would be reasonable for Laney to consider panel genetic testing to analyze multiple genes at once.    We discussed that genetic testing for breast cancer susceptibility genes is typically most informative when it is first performed on a family member with a personal history of breast cancer. In this situation, we discussed that Laney's aunt may be the best person to test first. Testing is also available to Laney's mother.  Should her mother complete genetic testing with negative/normal results, this would inform us that Laney cannot inherit a currently identifiable mutation/variant in any of those genes from her mother.  A positive result would indicate that Laney and her siblings each have a 50% chance of inheriting that same variant, and targeted genetic testing would be recommended.         Laney, her mother, and other close relatives (including her sisters and aunts) likely remain at increased risk for breast cancer given their family history. Breast cancer screening is generally recommended to begin approximately 10 years younger than the earliest age of breast cancer diagnosis in the family, or at age 40, whichever comes first. Breast screening options should be discussed with an individual's primary care provider and a genetic counselor, to determine at what age to begin screening, what screening is appropriate, and if additional screening (such as breast MRI) is necessary based on personal/family history factors.  Laney is encouraged to revisit these recommendations following her first mammogram, as  breast density may impact her estimated risk based in the IBISv8 model.     We reviewed available genetic testing options, including guidelines-based panels of genes most associated with the cancers in one's family (including genes related to primarily breast cancer), as well as expanded panels which include many genes associated with other types of cancers not in Laney's family.  Laney will contact me should she wish to proceed with a prior authorization for genetic testing.    Plan:  No genetic testing was completed at this time.  Cancer screening recommendations were reviewed.  Laney is encouraged to reach out should she be interested in revisiting testing options in the future, or to proceed with an insurance prior authorization to determine coverage first.      Face to face time: 45 minutes    Negra Fernandes MS, [facility]  Licensed, Certified Genetic Counselor  [facility]  Phone: [phone]

## 2022-05-17 NOTE — PROGRESS NOTES
Laney is a 41 year old who is being evaluated via a billable video visit.     Pt is in [place]     How would you like to obtain your AVS? MyChart  If the video visit is dropped, the invitation should be resent by: Send to e-mail at: [email or web address]  Will anyone else be joining your video visit? No link needed,  is present.    Video Start Time: 8:58 AM  Video-Visit Details    Type of service:  Video Visit    Video End Time:4:43    Originating Location (pt. Location): Home    Distant Location (provider location):  [facility]     Platform used for Video Visit: [employer] VF

## 2022-05-17 NOTE — LETTER
5/17/2022         RE: Laney Flood  [address]  [place] [address]        Dear Colleague,    Thank you for referring your patient, Laney Flood, to the [facility]. Please see a copy of my visit note below.    Laney is a 41 year old who is being evaluated via a billable video visit.     Pt is in [place]     How would you like to obtain your AVS? MyChart  If the video visit is dropped, the invitation should be resent by: Send to e-mail at: [email or web address]  Will anyone else be joining your video visit? No link needed,  is present.    Video Start Time: 8:58 AM  Video-Visit Details    Type of service:  Video Visit    Video End Time:4:43    Originating Location (pt. Location): Home    Distant Location (provider location):  [facility]     Platform used for Video Visit: RamírezEntegrion     Angelica LAMAS      5/17/2022    Referring Provider: Christiana Beach MD    Presenting Information:   I met with Laney Flood today for genetic counseling at the Cancer Risk Management Program (virtual visit) to discuss her family history of breast cancer.  She is here today to review this history, cancer screening recommendations, and available genetic testing options.    Personal History:  Laney is a 41 year old female. She does not have any personal history of cancer.  She had her first menstrual period at age 15, and she has her ovaries, fallopian tubes and uterus in place.     Family History: (Please see scanned pedigree for detailed family history information)    Laney's maternal grandmother was diagnosed with breast cancer in her late 50's, and a second breast cancer in her 60's-70's (reported as a second primary).  She also had a history of small cell lung cancer, and passed away at age 88    One maternal aunt was diagnosed recently with breast cancer at age 67    One maternal uncle passed away at age 30, and was diagnosed with either melanoma or  sarcoma at age 24    Laney has a maternal first cousin whose son had a diagnosis of neuroblastoma    Her father is in his 70's and has had basal cell carcinoma    Her ethnicity is Citizen of [place]/English.      Discussion:    Laney's family history of breast cancer may be suggestive of a hereditary cancer syndrome.    We reviewed the features of sporadic, familial, and hereditary cancers.  Based on her family history of three instances of breast cancer, Laney meets current National Comprehensive Cancer Network (NCCN) criteria for genetic testing of high penetrance breast cancer susceptibility genes (including BRCA1, BRCA2, among others).      We discussed the natural history and genetics of hereditary breast and ovarian cancer. A detailed handout regarding hereditary breast cancer and the information we discussed can be found in the after visit summary. Topics included: inheritance pattern, cancer risks, cancer screening recommendations, and also risks, benefits and limitations of testing.    We discussed that there are additional genes that could cause increased risk for breast, and/or other cancers as well. As many of these genes present with overlapping features in a family and accurate cancer risk cannot always be established based upon the pedigree analysis alone, it would be reasonable for Laney to consider panel genetic testing to analyze multiple genes at once.    We discussed that genetic testing for breast cancer susceptibility genes is typically most informative when it is first performed on a family member with a personal history of breast cancer. In this situation, we discussed that Laney's aunt may be the best person to test first. Testing is also available to Laney's mother.  Should her mother complete genetic testing with negative/normal results, this would inform us that Laney cannot inherit a currently identifiable mutation/variant in any of those genes from her mother.  A positive result would  indicate that Laney and her siblings each have a 50% chance of inheriting that same variant, and targeted genetic testing would be recommended.         Laney, her mother, and other close relatives (including her sisters and aunts) likely remain at increased risk for breast cancer given their family history. Breast cancer screening is generally recommended to begin approximately 10 years younger than the earliest age of breast cancer diagnosis in the family, or at age 40, whichever comes first. Breast screening options should be discussed with an individual's primary care provider and a genetic counselor, to determine at what age to begin screening, what screening is appropriate, and if additional screening (such as breast MRI) is necessary based on personal/family history factors.  Laney is encouraged to revisit these recommendations following her first mammogram, as breast density may impact her estimated risk based in the IBISv8 model.     We reviewed available genetic testing options, including guidelines-based panels of genes most associated with the cancers in one's family (including genes related to primarily breast cancer), as well as expanded panels which include many genes associated with other types of cancers not in Laney's family.  Laney will contact me should she wish to proceed with a prior authorization for genetic testing.    Plan:  No genetic testing was completed at this time.  Cancer screening recommendations were reviewed.  Laney is encouraged to reach out should she be interested in revisiting testing options in the future, or to proceed with an insurance prior authorization to determine coverage first.      Face to face time: 45 minutes        Again, thank you for allowing me to participate in the care of your patient.      Sincerely,    Negra Fernandes GC

## 2022-05-19 ENCOUNTER — THERAPY VISIT (OUTPATIENT)
Dept: PHYSICAL THERAPY | Facility: CLINIC | Age: 42
End: 2022-05-19
Payer: COMMERCIAL

## 2022-05-19 DIAGNOSIS — M25.551 HIP PAIN, RIGHT: Primary | ICD-10-CM

## 2022-05-19 PROCEDURE — 97110 THERAPEUTIC EXERCISES: CPT | Mod: GP | Performed by: PHYSICAL THERAPIST

## 2022-05-19 NOTE — PROGRESS NOTES
DISCHARGE REPORT    Progress reporting period is from 3/17/2022 to 5/19/2022. Patient seen for 4 visits        SUBJECTIVE  Subjective changes noted by patient: Subjective: Patient states that her hips are doing a lot better, and she feels like the exercises are going really well. She feels ready to discharge with use of HEP    Current pain level is Current Pain level: 0/10.     Previous pain level was   .   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  The patient has met all of their long term goals.  Self Management Plans:  Patient has been instructed in a home treatment program.  Patient is independent in a home treatment program.  Patient  has been instructed in self management of symptoms.  Patient is independent in self management of symptoms.  I have re-evaluated this patient and find that the nature, scope, duration and intensity of the therapy is appropriate for the medical condition of the patient.  Laney continues to require the following intervention to meet STG and LTG's:  PT intervention is no longer required to meet STG/LTG.    Recommendations:  This patient is ready to be discharged from therapy and continue their home treatment program. Patient to follow up with PT or MD as needed    Please refer to the daily flowsheet for treatment today, total treatment time and time spent  performing 1:1 timed codes.

## 2022-05-19 NOTE — PATIENT INSTRUCTIONS
Assessing Cancer Risk  Only about 5-10% of cancers are thought to be due to an inherited cancer susceptibility gene.    These families often have:  Several people with the same or related types of cancer  Cancers diagnosed at a young age (before age 50)  Individuals with more than one primary cancer  Multiple generations of the family affected with cancer    Some people may be candidates for genetic testing of more than one gene.  For these families, genetic testing using a cancer panel may be offered.  These panels will test different genes known to increase the risk for breast, ovarian, uterine, and/or other cancers. All of the genes discussed below have published clinical management guidelines for individuals who are found to carry a mutation. The purpose of this handout is to serve as a brief summary of the genes analyzed by the panels used to inquire about hereditary breast and gynecologic cancer:  OMI, BRCA1, BRCA2, BRIP1, CDH1, CHEK2, MLH1, MSH2, MSH6, PMS2, EPCAM, PTEN, PALB2, RAD51C, RAD51D, and TP53.  ______________________________________________________________________________  Hereditary Breast and Ovarian Cancer Syndrome   (BRCA1 and BRCA2)  A single mutation in one of the copies of BRCA1 or BRCA2 increases the risk for breast and ovarian cancer, among others.  The risk for pancreatic cancer and melanoma may also be slightly increased in some families.  The chart below shows the chance that someone with a BRCA mutation would develop cancer in his or her lifetime1,2,3,4.        A person s ethnic background is also important to consider, as individuals of Ashkenazi Congregation ancestry have a higher chance of having a BRCA gene mutation.  There are three BRCA mutations that occur more frequently in this population.    Acosta Syndrome   (MLH1, MSH2, MSH6, PMS2, and EPCAM)  Currently five genes are known to cause Acosta Syndrome: MLH1, MSH2, MSH6, PMS2, and EPCAM.  A single mutation in one of the Acosta  Syndrome genes increases the risk for colon, endometrial, ovarian, and stomach cancers.  Other cancers that occur less commonly in Acosta Syndrome include urinary tract, skin, and brain cancers.  The chart below shows the chance that a person with Acosta syndrome would develop cancer in his or her lifetime5.      *Cancer risk varies depending on Acosta syndrome gene found    Cowden Syndrome   (PTEN)  Cowden syndrome is a hereditary condition that increases the risk for breast, thyroid, endometrial, colon, and kidney cancer.  Cowden syndrome is caused by a mutation in the PTEN gene.  A single mutation in one of the copies of PTEN causes Cowden syndrome and increases cancer risk.  The chart below shows the chance that someone with a PTEN mutation would develop cancer in their lifetime6,7.  Other benign features seen in some individuals with Cowden syndrome include benign skin lesions (facial papules, keratoses, lipomas), learning disability, autism, thyroid nodules, colon polyps, and larger head size.      *One recent study found breast cancer risk to be increased to 85%    Li-Fraumeni Syndrome   (TP53)  Li-Fraumeni Syndrome (LFS) is a cancer predisposition syndrome caused by a mutation in the TP53 gene. A single mutation in one of the copies of TP53 increases the risk for multiple cancers. Individuals with LFS are at an increased risk for developing cancer at a young age. The lifetime risk for development of a LFS-associated cancer is 50% by age 30 and 90% by age 60.   Core Cancers: Sarcomas, Breast, Brain, Lung, Leukemias/Lymphomas, Adrenocortical carcinomas  Other Cancers: Gastrointestinal, Thyroid, Skin, Genitourinary    Hereditary Diffuse Gastric Cancer   (CDH1)  Currently, one gene is known to cause hereditary diffuse gastric cancer (HDGC): CDH1.  Individuals with HDGC are at increased risk for diffuse gastric cancer and lobular breast cancer. Of people diagnosed with HDGC, 30-50% have a mutation in the CDH1 gene.   This suggests there are likely other genes that may cause HDGC that have not been identified yet.      Lifetime Cancer Risks    General Population HDGC    Diffuse Gastric  <1% ~80%   Breast 12% 39-52%         Additional Genes  OMI  OMI is a moderate-risk breast cancer gene. Women who have a mutation in OMI can have between a 2-4 fold increased risk for breast cancer compared to the general population8. OMI mutations have also been associated with increased risk for pancreatic cancer, however an estimate of this cancer risk is not well understood9. Individuals who inherit two OMI mutations have a condition called ataxia-telangiectasia (AT).  This rare autosomal recessive condition affects the nervous system and immune system, and is associated with progressive cerebellar ataxia beginning in childhood.  Individuals with ataxia-telangiectasia often have a weakened immune system and have an increased risk for childhood cancers.    PALB2  Mutations in PALB2 have been shown to increase the risk of breast cancer up to 33-58% in some families; where individuals fall within this risk range is dependent upon family [id number]. PALB2 mutations have also been associated with increased risk for pancreatic cancer, although this risk has not been quantified yet.  Individuals who inherit two PALB2 mutations--one from their mother and one from their father--have a condition called Fanconi Anemia.  This rare autosomal recessive condition is associated with short stature, developmental delay, bone marrow failure, and increased risk for childhood cancers.    CHEK2   CHEK2 is a moderate-risk breast cancer gene.  Women who have a mutation in CHEK2 have around a 2-fold increased risk for breast cancer compared to the general population, and this risk may be higher depending upon family history.11,12,13 Mutations in CHEK2 have also been shown to increase the risk of a number of other cancers, including colon and prostate, however these  cancer risks are currently not well understood.    BRIP1, RAD51C and RAD51D  Mutations in BRIP1, RAD51C, and RAD51D have been shown to increase the risk of ovarian cancer and possibly female breast cancer as well14,15 .       Lifetime Cancer Risk    General Population BRIP1 RAD51C RAD51D   Ovarian 1-2% ~5-8% ~5-9% ~7-15%           Inheritance  All of the cancer syndromes reviewed above are inherited in an autosomal dominant pattern.  This means that if a parent has a mutation, each of his or her children will have a 50% chance of inheriting that same mutation.  Therefore, each child--male or female--would have a 50% chance of being at increased risk for developing cancer.      Image obtained from Genetics Home Reference, 2013     Mutations in some genes can occur de bk, which means that a person s mutation occurred for the first time in them and was not inherited from a parent.  Now that they have the mutation, however, it can be passed on to future generations.    Genetic Testing  Genetic testing involves a blood test and will look at the genetic information in the OMI, BRCA1, BRCA2, BRIP1, CDH1, CHEK2, MLH1, MSH2, MSH6, PMS2, EPCAM, PTEN, PALB2, RAD51C, RAD51D, and TP53 genes for any harmful mutations that are associated with increased cancer risk.  If possible, it is recommended that the person(s) who has had cancer be tested before other family members.  That person will give us the most useful information about whether or not a specific gene is associated with the cancer in the family.    Results  There are three possible results of genetic testing:  Positive--a harmful mutation was identified in one or more of the genes  Negative--no mutation was identified in any of the genes on this panel  Variant of unknown significance--a variation in one of the genes was identified, but it is unclear how this impacts cancer risk in the family    Advantages and Disadvantages   There are advantages and disadvantages to  genetic testing.    Advantages  May clarify your cancer risk  Can help you make medical decisions  May explain the cancers in your family  May give useful information to your family members (if you share your results)    Disadvantages  Possible negative emotional impact of learning about inherited cancer risk  Uncertainty in interpreting a negative test result in some situations  Possible genetic discrimination concerns (see below)    Genetic Information Nondiscrimination Act (WINSOME)  WINSOME is a federal law that protects individuals from health insurance or employment discrimination based on a genetic test result alone.  Although rare, there are currently no legal discrimination protections in terms of life insurance, long term care, or disability insurances.  Visit the [employer] [place] website to learn more.    Reducing Cancer Risk  All of the genes described above have nationally recognized cancer screening guidelines that would be recommended for individuals who test positive.  In addition to increased cancer screening, surgeries may be offered or recommended to reduce cancer risk.  Recommendations are based upon an individual s genetic test result as well as their personal and family history of cancer.    Questions to Think About Regarding Genetic Testing:  What effect will the test result have on me and my relationship with my family members if I have an inherited gene mutation?  If I don t have a gene mutation?  Should I share my test results, and how will my family react to this news, which may also affect them?  Are my children ready to learn new information that may one day affect their own health?    Hereditary Cancer Resources    FORCE: Facing Our Risk of Cancer Empowered facingourrisk.org   Bright Pink bebrightpink.org   Li-Fraumeni Syndrome Association lfsassociation.org   PTEN World PTENworld.MindSumo   No stomach for cancer, Inc. nostomachforcancer.org   Stomach cancer relief network  Scrnet.org   Collaborative Group of the Americas on Inherited Colorectal Cancer (CGA) cgaicc.com    Cancer Care cancercare.org   American Cancer Society (ACS) cancer.org   National Cancer [place] (NCI) cancer.gov     Please call us if you have any questions or concerns.   Cancer Risk Management Program [phone] ([phone])  Damaso Nicholas, [place] [facility] [phone]  Leslie Bernardino, MS, [facility] [phone]  Monet Isaac, MS, [facility]  [phone]  Negra Fernandes, MS, [facility]  [phone]  Ewa Osmar, MS, [facility]  [phone]  Rut Lovell, MS, [facility] [phone]  Ann-Marie Cordova, MS, [facility] [phone]    References  Willis Anderson PDP, Jackie S, Sebas NOLEN, Mandeep JE, Abhi JL, Angelica N, Dee H, Anatoly O, Lizbeth A, Yuko B, Sherman P, Manhector S, Mague DM, Brandan N, Cesar E, Anabella H, Fam E, Lubinski J, Gronwald J, Jaxon B, Jacques H, Thorlacius S, Eerola H, Renetta H, Rosario K, Danielle OP. Average risks of breast and ovarian cancer associated with BRCA1 or BRCA2 mutations detected in case series unselected for family history: a combined analysis of 222 studies. Am J Hum Lana. 2003;72:1117-30.  Carito N, Mary M, Gualberto G.  BRCA1 and BRCA2 Hereditary Breast and Ovarian Cancer. Gene Reviews online. 2013.  Ted YC, Asif S, Willard G, Vaughn S. Breast cancer risk among male BRCA1 and BRCA2 mutation carriers. J Natl Cancer Inst. 2007;99:1811-4.  Darnell DEGROOT, Priya I, Florian J, Jorge L E, Geo ER, Christel F. Risk of breast cancer in male BRCA2 carriers. J Med Lana. 2010;47:710-1.  National Comprehensive Cancer Network. Clinical practice guidelines in oncology, colorectal cancer screening. Available online (registration required). 2015.  Isidoro SOTO, Con J, Alicia J, Jason LA, Nesha MS, Eng C. Lifetime cancer risks in individuals with germline PTEN mutations. Clin Cancer Res. 2012;18:400-7.  Deepak SAEZ. Cowden Syndrome: A Critical Review of the Clinical Literature. J Lana .  2009:18:13-27.  Mila A, Adama D, Chantel S, Keiko P, Radha T, Marleny M, Da B, Dipika H, Araceli R, Fantasma K, Felicita L, Darnell DG, Mague D, Daniel DF, Wu MR, The Breast Cancer Susceptibility Collaboration () & Asmita ROMO. OMI mutations that cause ataxia-telangiectasia are breast cancer susceptibility alleles. Nature Genetics. 2006;38:873-875  Jose N , Santos Y, Peggy J, Abby L, Jp GM , David ML, Gallinger S, Krueger AG, Syngal S, Shukri ML, Cher J , Josselyn R, Nani SZ, Saul JR, Miles VE, Dayan M, Voanamika B, Oziel N, Abilio RH, Heather KW, and Marley AP. OMI mutations in patients with hereditary pancreatic cancer. Cancer Discover. 2012;2:41-46  Maycol DELGADO., et al. Breast-Cancer Risk in Families with Mutations in PALB2. NEJM. 2014; 371(6):497-506.  CHEK2 Breast Cancer Case-Control Consortium. CHEK2*1100delC and susceptibility to breast cancer: A collaborative analysis involving 10,860 breast cancer cases and 9,065 controls from 10 studies. Am J Hum Lana, 74 (2004), pp. 9165-1657  Mario T, Mansi S, Leslie K, et al. Spectrum of Mutations in BRCA1, BRCA2, CHEK2, and TP53 in Families at High Risk of Breast Cancer. BEST. 2006;295(12):5544-8928.   Reji C, Rashmi D, Kanchan A, et al. Risk of breast cancer in women with a CHEK2 mutation with and without a family history of breast cancer. J Clin Oncol. 2011;29:3714-7470.  Desmond H, Dalton E, Richar SJ, et al. Contribution of germline mutations in the RAD51B, RAD51C, and RAD51D genes to ovarian cancer in the population. J Clin Oncol. 2015;33(26):4520-3502. Doi:10.1200/JCO.2015.61.2408.  Hilton Arceoon DF, Del P, et al. Mutations in BRIP1 confer high risk of ovarian cancer. Barbara Lana. 2011;43(11):9017-2639. doi:10.1038/ng.955.

## 2022-09-03 ENCOUNTER — HEALTH MAINTENANCE LETTER (OUTPATIENT)
Age: 42
End: 2022-09-03

## 2022-09-14 ENCOUNTER — THERAPY VISIT (OUTPATIENT)
Dept: PHYSICAL THERAPY | Facility: CLINIC | Age: 42
End: 2022-09-14
Payer: COMMERCIAL

## 2022-09-14 DIAGNOSIS — M25.551 HIP PAIN, RIGHT: Primary | ICD-10-CM

## 2022-09-14 PROCEDURE — 97110 THERAPEUTIC EXERCISES: CPT | Mod: GP

## 2022-09-14 ASSESSMENT — ACTIVITIES OF DAILY LIVING (ADL)
GETTING_INTO_AND_OUT_OF_AN_AVERAGE_CAR: SLIGHT DIFFICULTY
RECREATIONAL_ACTIVITIES: EXTREME DIFFICULTY
LIGHT_TO_MODERATE_WORK: SLIGHT DIFFICULTY
HOW_WOULD_YOU_RATE_YOUR_CURRENT_LEVEL_OF_FUNCTION_DURING_YOUR_USUAL_ACTIVITIES_OF_DAILY_LIVING_FROM_0_TO_100_WITH_100_BEING_YOUR_LEVEL_OF_FUNCTION_PRIOR_TO_YOUR_HIP_PROBLEM_AND_0_BEING_THE_INABILITY_TO_PERFORM_ANY_OF_YOUR_USUAL_DAILY_ACTIVITIES?: 60
HOS_ADL_COUNT: 15
HOS_ADL_SCORE(%): 68.33
HOS_ADL_ITEM_SCORE_TOTAL: 41
WALKING_DOWN_STEEP_HILLS: MODERATE DIFFICULTY
GETTING_INTO_AND_OUT_OF_A_BATHTUB: SLIGHT DIFFICULTY
PUTTING_ON_SOCKS_AND_SHOES: SLIGHT DIFFICULTY
WALKING_APPROXIMATELY_10_MINUTES: SLIGHT DIFFICULTY
GOING_DOWN_1_FLIGHT_OF_STAIRS: NO DIFFICULTY AT ALL
GOING_UP_1_FLIGHT_OF_STAIRS: NO DIFFICULTY AT ALL
ROLLING_OVER_IN_BED: SLIGHT DIFFICULTY
WALKING_15_MINUTES_OR_GREATER: MODERATE DIFFICULTY
SITTING_FOR_15_MINUTES: SLIGHT DIFFICULTY
WALKING_UP_STEEP_HILLS: MODERATE DIFFICULTY
DEEP_SQUATTING: EXTREME DIFFICULTY
STEPPING_UP_AND_DOWN_CURBS: NO DIFFICULTY AT ALL
WALKING_INITIALLY: SLIGHT DIFFICULTY
STANDING_FOR_15_MINUTES: SLIGHT DIFFICULTY
HOS_ADL_HIGHEST_POTENTIAL_SCORE: 60

## 2022-09-14 NOTE — PROGRESS NOTES
PROGRESS  REPORT  Progress reporting period is from 5/19/2022 to 9/14/22.       SUBJECTIVE  Subjective changes noted by patient:  Pt was doing really well since her last PT visit. Has been working on some of her PT exercises. Monday she started doing a leg workout without doing a warm up. Afterward she bent forward to pick something up and felt a pinch in her R low back/hip. She feels like she has to walk bent forward, having a hard time changing positions in bed. Heat has been helpful. Tried going for a walk to loosen things up, but thinks this made it a little worse. Has done some quad/hamstring stretching which seemed to help. Has tried some tennis ball/massage. This pain feels similar to her old symptoms.    Changes in function:  Yes (See Goal flowsheet attached for changes in current functional level)  Adverse reaction to treatment or activity: Exercise session earlier this week.    OBJECTIVE  Changes noted in objective findings:  See below.    Dynamic Movement Screen:  2 leg squat: limited depth d/t pain, good form    Lumbar ROM: flexion hand to distal shin *sacral area;' extension max loss *sacral area; sidebending WNL KRISTEN. (*indicates pain)           Hip Joint ROM: WNL KRISTEN, no pain at end ranges    Lower Extremity Muscle Strength (x/5)   Hip IR Hip ER Knee Ext Hip ABD Hip Ext Knee Flex   Right  4+/5 5-/5 5/5 4+/5 4/5 5/5   Left 4+/5 5-/5 5/5 5/5 4+/5 5/5       Lower Extremity Flexibility Screen:  Hamstrings (Supine SLR): Right: -; Left: -  Quadriceps (Prone KF): Right: mild; Left: mild      Hip Special Testing:  Test Right Left   FADDIR (-) (-)   ANDREW (+) (+ pain on R side)       Hip Palpation:  Tender to palpation at: gluteus medius muscle belly, gluteus medius tendon insertion, piriformis, PSIS -- R>L    SI special tests: Negative thigh thrust, sacral thrust, gapping and compression         ASSESSMENT/PLAN  Pt experiencing exacerbation of R glute symptoms. She will benefit from continued skilled physical  therapy for adjustment of HEP while symptoms are flared and instruction for progression as symptoms improve.    Updated problem list and treatment plan: Diagnosis 1:  R glute pain  Pain -  hot/cold therapy, manual therapy, self management, education, directional preference exercise and home program  Decreased ROM/flexibility - manual therapy, therapeutic exercise, therapeutic activity and home program  Decreased strength - therapeutic exercise, therapeutic activities and home program  Decreased proprioception - neuro re-education, therapeutic activities and home program  Decreased function - therapeutic activities and home program  STG/LTGs have been met or progress has been made towards goals:  Yes (See Goal flow sheet completed today.)  Assessment of Progress: The patient's condition has potential to improve.  Self Management Plans:  Patient has been instructed in a home treatment program.  I have re-evaluated this patient and find that the nature, scope, duration and intensity of the therapy is appropriate for the medical condition of the patient.  Lnaey continues to require the following intervention to meet STG and LTG's:  PT    Recommendations:  This patient would benefit from continued therapy.     Frequency:  2 X month, once daily  Duration:  for 2 months        Please refer to the daily flowsheet for treatment today, total treatment time and time spent performing 1:1 timed codes.

## 2022-09-26 ENCOUNTER — THERAPY VISIT (OUTPATIENT)
Dept: PHYSICAL THERAPY | Facility: CLINIC | Age: 42
End: 2022-09-26
Payer: COMMERCIAL

## 2022-09-26 DIAGNOSIS — M25.551 HIP PAIN, RIGHT: Primary | ICD-10-CM

## 2022-09-26 PROCEDURE — 97110 THERAPEUTIC EXERCISES: CPT | Mod: GP

## 2023-01-06 ENCOUNTER — VIRTUAL VISIT (OUTPATIENT)
Dept: FAMILY MEDICINE | Facility: CLINIC | Age: 43
End: 2023-01-06
Payer: COMMERCIAL

## 2023-01-06 DIAGNOSIS — M79.18 PAIN IN BUTTOCK: ICD-10-CM

## 2023-01-06 DIAGNOSIS — M53.3 SACROILIAC JOINT PAIN: ICD-10-CM

## 2023-01-06 DIAGNOSIS — M25.60 MORNING STIFFNESS OF JOINTS: Primary | ICD-10-CM

## 2023-01-06 DIAGNOSIS — M79.10 MYALGIA: ICD-10-CM

## 2023-01-06 PROCEDURE — 99214 OFFICE O/P EST MOD 30 MIN: CPT | Mod: GT | Performed by: FAMILY MEDICINE

## 2023-01-06 NOTE — PROGRESS NOTES
"Laney is a 42 year old who is being evaluated via a billable video visit.      How would you like to obtain your AVS? MyChart  If the video visit is dropped, the invitation should be resent by: Send to e-mail at: [email or web address]  Will anyone else be joining your video visit? No        Assessment & Plan     Morning stiffness of joints/ Myalgia  - ESR: Erythrocyte sedimentation rate  - Anti Nuclear Mary IgG by IFA with Reflex  - CRP, inflammation  - Comprehensive metabolic panel (BMP + Alb, Alk Phos, ALT, AST, Total. Bili, TP)  - XR Sacroiliac Joint G/E 3 Views    Pain in buttock  - XR Lumbar Spine 2/3 Views    Sacroiliac joint pain  - XR Sacroiliac Joint G/E 3 Views    Re abdominal pain - may have been due to constipation.  Seems to be resolving, but she will let me know if this returns       Return for pending lab and xray results.    Christiana Beach MD  [facility]   Laney is a 42 year old accompanied by her self, presenting for the following health issues:  Recheck Medication (Next step for reoccurring glute pain- )      History of Present Illness       Reason for visit:  Discuss next steps for glute pain.    She eats 4 or more servings of fruits and vegetables daily.She consumes 1 sweetened beverage(s) daily.She exercises with enough effort to increase her heart rate 10 to 19 minutes per day.  She exercises with enough effort to increase her heart rate 3 or less days per week.   She is taking medications regularly.     When Lakshmi saw me in March 2022 for an annual exam she described the following injury and pain affected right back and gluteus::    \" Hurt back around [place] - was walking quickly around neighborhood and slipped and almost but did not  fall. Then she thought she had healed from thi, however in the shower - reach across to lower leg and pain came back -was really laid up, ice, massages chiropractor. Pain gets better then comes back. " "Notices mostly on right - deep inside but muscle - sometimes or left . Stretching improves this.\"    I referred her for physical therapy and this did improve her pain, however she says that she seems to keep re injuring herself.  Over the summer she was pretty good, but the pain recurred in the fall and she again saw physical therapy.  She picked up some techniques including tennis ball and ice massage.  Every morning she does 20 minutes stretches.  Then she went to a yoga class-had a great class-but later bent over to get a laundry basket and had onset of pain once again.  The pain was severe enough for her to request to work from home the next day    She notes that when she loosens her quadriceps muscles, this also seems to help with loosening her hip and gluteus muscles.    ROS   - No numbness or tingling   - The other day had pain going down into front of leg.    - She has pain in both sides of her low back and gluteus, however it is more in the right.  She feels tightness on the left   -She also has some mild pain in her neck and shoulders   - Feels worse in the morning - does stretches and then feels better   - she has Hand joint stiffness in the morning    Abdominal pain -Laney notes some yellowish vaginal discharge which is since resolved as well as pain in her low abdomen last week.     -  no pain with urination.   - No vaginal bleeding that is not with menses   - No nausea or diarrhea   - she has had some constipation and gas. Has had some constipation and gas      Review of Systems         Objective       Vitals:  No vitals were obtained today due to virtual visit.    Physical Exam   GENERAL: Healthy, alert and no distress  EYES: Eyes grossly normal to inspection.  No discharge or erythema, or obvious scleral/conjunctival abnormalities.  RESP: No audible wheeze, cough, or visible cyanosis.  No visible retractions or increased work of breathing.    MS: In demonstrating the area of her pain, Laney seems " to be pointing to her SI joint area on both sides  SKIN: Visible skin clear. No significant rash, abnormal pigmentation or lesions.  NEURO: Cranial nerves grossly intact.  Mentation and speech appropriate for age.   Straight leg raise normal.  PSYCH: Mentation appears normal, affect normal/bright, judgement and insight intact, normal speech and appearance well-groomed.        Video-Visit Details    Type of service:  Video Visit     Originating Location (pt. Location): Home  Distant Location (provider location):  On-site  Platform used for Video Visit: Angel Thompson visit started at 7:05 AM and Ended 7:26  AM

## 2023-01-09 ENCOUNTER — LAB (OUTPATIENT)
Dept: LAB | Facility: CLINIC | Age: 43
End: 2023-01-09
Payer: COMMERCIAL

## 2023-01-09 ENCOUNTER — HOSPITAL ENCOUNTER (OUTPATIENT)
Dept: GENERAL RADIOLOGY | Facility: HOSPITAL | Age: 43
Discharge: HOME | End: 2023-01-09
Attending: FAMILY MEDICINE
Payer: COMMERCIAL

## 2023-01-09 DIAGNOSIS — M79.18 PAIN IN BUTTOCK: ICD-10-CM

## 2023-01-09 DIAGNOSIS — M25.60 MORNING STIFFNESS OF JOINTS: ICD-10-CM

## 2023-01-09 DIAGNOSIS — M79.10 MYALGIA: ICD-10-CM

## 2023-01-09 DIAGNOSIS — M53.3 SACROILIAC JOINT PAIN: ICD-10-CM

## 2023-01-09 LAB
ALBUMIN SERPL BCG-MCNC: 4.6 G/DL (ref 3.5–5.2)
ALP SERPL-CCNC: 48 U/L (ref 35–104)
ALT SERPL W P-5'-P-CCNC: 29 U/L (ref 10–35)
ANION GAP SERPL CALCULATED.3IONS-SCNC: 15 MMOL/L (ref 7–15)
AST SERPL W P-5'-P-CCNC: 27 U/L (ref 10–35)
BILIRUB SERPL-MCNC: 0.3 MG/DL
BUN SERPL-MCNC: 8.9 MG/DL (ref 6–20)
CALCIUM SERPL-MCNC: 9.6 MG/DL (ref 8.6–10)
CHLORIDE SERPL-SCNC: 100 MMOL/L (ref 98–107)
CREAT SERPL-MCNC: 0.63 MG/DL (ref 0.51–0.95)
CRP SERPL-MCNC: <3 MG/L
DEPRECATED HCO3 PLAS-SCNC: 21 MMOL/L (ref 22–29)
ERYTHROCYTE [SEDIMENTATION RATE] IN BLOOD BY WESTERGREN METHOD: 15 MM/HR (ref 0–20)
GFR SERPL CREATININE-BSD FRML MDRD: >90 ML/MIN/1.73M2
GLUCOSE SERPL-MCNC: 80 MG/DL (ref 70–99)
POTASSIUM SERPL-SCNC: 3.5 MMOL/L (ref 3.4–5.3)
PROT SERPL-MCNC: 7.7 G/DL (ref 6.4–8.3)
SODIUM SERPL-SCNC: 136 MMOL/L (ref 136–145)

## 2023-01-09 PROCEDURE — 86140 C-REACTIVE PROTEIN: CPT

## 2023-01-09 PROCEDURE — 72202 X-RAY EXAM SI JOINTS 3/> VWS: CPT

## 2023-01-09 PROCEDURE — 85652 RBC SED RATE AUTOMATED: CPT

## 2023-01-09 PROCEDURE — 80053 COMPREHEN METABOLIC PANEL: CPT

## 2023-01-09 PROCEDURE — 36415 COLL VENOUS BLD VENIPUNCTURE: CPT

## 2023-01-09 PROCEDURE — 72100 X-RAY EXAM L-S SPINE 2/3 VWS: CPT

## 2023-01-09 PROCEDURE — 86038 ANTINUCLEAR ANTIBODIES: CPT

## 2023-01-09 PROCEDURE — 86039 ANTINUCLEAR ANTIBODIES (ANA): CPT

## 2023-01-11 LAB
ANA PAT SER IF-IMP: ABNORMAL
ANA SER QL IF: ABNORMAL
ANA TITR SER IF: ABNORMAL {TITER}

## 2023-01-12 DIAGNOSIS — G89.29 CHRONIC GLUTEAL PAIN: ICD-10-CM

## 2023-01-12 DIAGNOSIS — M79.18 CHRONIC GLUTEAL PAIN: ICD-10-CM

## 2023-01-12 DIAGNOSIS — M54.50 LUMBAR PAIN: Primary | ICD-10-CM

## 2023-02-20 ENCOUNTER — OFFICE VISIT (OUTPATIENT)
Dept: PHYSICAL MEDICINE AND REHAB | Facility: CLINIC | Age: 43
End: 2023-02-20
Attending: FAMILY MEDICINE
Payer: COMMERCIAL

## 2023-02-20 DIAGNOSIS — M53.3 PAIN OF RIGHT SACROILIAC JOINT: Primary | ICD-10-CM

## 2023-02-20 PROCEDURE — 99204 OFFICE O/P NEW MOD 45 MIN: CPT | Performed by: PHYSICIAN ASSISTANT

## 2023-02-20 NOTE — LETTER
2/20/2023         RE: Laney Flood  [address]  [place] [address]        Dear Colleague,    Thank you for referring your patient, Laney Flood, to the [facility]. Please see a copy of my visit note below.    ASSESSMENT: Laney Flood is a 42 year old female who is otherwise healthy who presents today for new patient evaluation of chronic right upper buttock pain.  Pain is most consistent with sacroiliac joint dysfunction.  She had a positive Michelle finger test and reproduction of pain with SI joint provocative maneuvers x1.  X-ray of the sacroiliac joints was normal.  An x-ray lumbar spine showed mild disc degeneration and facet arthropathy at L5-S1 which could be contributing to her pain.  She is neurologically intact.    PLAN:  A shared decision making model was used.  The patient's values and choices were respected.  The following represents what was discussed and decided upon by the physician assistant and the patient.      1.  DIAGNOSTIC TESTS:    -I reviewed the x-ray lumbar spine.  - I reviewed the x-ray sacroiliac joints  - No additional diagnostic test were ordered.  Patient is going to try additional conservative treatments first.  She is neurologically intact.  If symptoms fail to improve with additional conservative treatments I would recommend obtaining an MRI lumbar spine for further evaluation.    2.  PHYSICAL THERAPY: Patient completed 6 sessions of physical therapy September 26, 2022.  She would like to try additional physical therapy approach that is more holistic.  I entered a referral for the patient to go to physical therapy at [facility] for postural restoration therapy.    3.  MEDICATIONS: No changes are made to the patient's medications.  She is not currently taking any pain relieving medications.  She could use ibuprofen if needed.    4.  INTERVENTIONS: We discussed a right sacroiliac joint injection.  Patient will consider this if symptoms  fail to improve.  She would like to try additional conservative treatments first.    5.  PATIENT EDUCATION: Patient is in agreement the above plan.  All questions were answered.  -I told the patient she could look into a sacroiliac belt to be worn as needed to help with pain management.    6.  FOLLOW-UP:   Patient is going to follow-up with me in about 6 weeks to monitor progress with physical therapy.  If she has questions or concerns in the meantime, she should not hesitate to call.      SUBJECTIVE:  Laney Flood  Is a 42 year old female who presents today in consultation at the request of Dr. Beach for new patient evaluation of chronic right upper buttock pain.  Patient reports that she began to have pain December 2021 after a slip and fall on ice.  Pain persisted so she went to physical therapy which was somewhat helpful, but she continues to experience frequent pain.  She has episodes of more severe flares that can be debilitating.  Most recent severe flare was in January 2023.    Patient complains of right upper buttock pain.  Patient points with 1 finger to the right SI joint when asked to point to the pain.  She describes the pain as a nagging pain which is occasionally sharp with certain movements.  The pain can radiate up to the lower lumbar region and occasionally radiates into the buttock and posterior thigh.  Denies pain distal to the knee.  In January she experienced some pain in the left buttock but otherwise it has always been on the right side.  Pain is aggravated with sitting too long and doing too many hip stretches.  Pain is sometimes alleviated with yoga and using a massage gun.  She feels a weak sensation in the right leg when she sits for too long.  She denies loss of bowel or bladder control.  Denies recent fevers, chills, sweats.    Treatment to date:  - Physical therapy times 6 ending September 26, 2022 which provided slight relief of pain  - Chiropractic treatment which is helpful  -  No spine surgeries  - No spine injections  - Ibuprofen as needed which helps somewhat.    Current Outpatient Medications   Medication     VITAMIN D, CHOLECALCIFEROL, PO     No current facility-administered medications for this visit.       No Known Allergies    Past medical history: None        Past Surgical History:   Procedure Laterality Date     HYMENECTOMY  2006       Family History   Problem Relation Age of Onset     Thyroid Disease Mother      Hypertension Father      Asthma Brother      Hypothyroidism Mother      Hypertension Mother      Heart Failure Father      Hyperlipidemia Father      Coronary Artery Disease Father         (MINOR)     Asthma Brother      Cancer Maternal Grandmother      Heart Disease Maternal Grandfather      Coronary Artery Disease Paternal Grandmother      Coronary Artery Disease Paternal Grandfather        Social history: Patient is .  She works as a .  She drinks 1 alcoholic beverage per week.  Denies tobacco use.  Denies illicit drug use.      ROS: Positive for headache.  Specifically negative for bowel/bladder dysfunction, fevers,chills, appetite changes, unexplained weight loss.   Otherwise 13 systems reviewed are negative.  Please see the patient's intake questionnaire from today for details.      OBJECTIVE:  PHYSICAL EXAMINATION:    CONSTITUTIONAL:  Vital signs as above.  No acute distress.  The patient is well nourished and well groomed.  PSYCHIATRIC:  The patient is awake, alert, oriented to person, place, time and answering questions appropriately with clear speech.    HEENT: Normocephalic, atraumatic.  Sclera clear.  Neck is supple.  SKIN:  Skin over the face, bilateral lower extremities, and posterior torso is clean, dry, intact without rashes.    GAIT:  Gait is non-antalgic.  The patient is able to heel and toe walk without significant difficulty.    STANDING EXAMINATION:   Mild tenderness palpation right L5-S1 paraspinous muscle.  Mild tenderness  palpation right sacroiliac joint.  Mild tenderness palpation bilateral greater trochanters.  Lumbar range of motion is full with flexion and extension.  MUSCLE STRENGTH:  The patient has 5/5 strength for the bilateral hip flexors, knee flexors/extensors, ankle dorsiflexors/plantar flexors, great toe extensors, ankle evertors/invertors.  NEUROLOGICAL:  2/4 patellar, and achilles reflexes bilaterally.  Negative Babinski's bilaterally.  No ankle clonus bilaterally. Sensation to light touch is intact in the bilateral L4, L5, and S1 dermatomes.  VASCULAR:  2/4 dorsalis pedis pulses bilaterally.  Bilateral lower extremities are warm.  There is no pitting edema of the bilateral lower extremities.  ABDOMINAL:  Soft, non-distended, non-tender throughout all quadrants.  No pulsatile mass palpated in the left lower quadrant.  LYMPH NODES:  No palpable or tender inguinal lymph nodes.  MUSCULOSKELETAL: Negative pelvic distraction test.  Negative thigh thrust bilaterally.  Negative Matias's test bilaterally.  Positive Gaenslen's test bilaterally reproducing right SI joint pain.  Negative Yeoman's test.  Positive Michelle finger test.  Range of motion of the hips are full.    RESULTS:    I reviewed the x-ray sacroiliac joints from [facility] dated January 9, 2023.  These are normal.  No erosive change.  No fracture.    I reviewed the x-ray lumbar spine from [facility] dated January 9, 2023.  This shows minor disc space narrowing L2-3 and L5-S1 and minor L5-S1 facet arthropathy.          Again, thank you for allowing me to participate in the care of your patient.        Sincerely,        Dianne Kimball PA-C

## 2023-02-20 NOTE — PROGRESS NOTES
ASSESSMENT: Laney Flood is a 42 year old female who is otherwise healthy who presents today for new patient evaluation of chronic right upper buttock pain.  Pain is most consistent with sacroiliac joint dysfunction.  She had a positive Michelle finger test and reproduction of pain with SI joint provocative maneuvers x1.  X-ray of the sacroiliac joints was normal.  An x-ray lumbar spine showed mild disc degeneration and facet arthropathy at L5-S1 which could be contributing to her pain.  She is neurologically intact.    PLAN:  A shared decision making model was used.  The patient's values and choices were respected.  The following represents what was discussed and decided upon by the physician assistant and the patient.      1.  DIAGNOSTIC TESTS:    -I reviewed the x-ray lumbar spine.  - I reviewed the x-ray sacroiliac joints  - No additional diagnostic test were ordered.  Patient is going to try additional conservative treatments first.  She is neurologically intact.  If symptoms fail to improve with additional conservative treatments I would recommend obtaining an MRI lumbar spine for further evaluation.    2.  PHYSICAL THERAPY: Patient completed 6 sessions of physical therapy September 26, 2022.  She would like to try additional physical therapy approach that is more holistic.  I entered a referral for the patient to go to physical therapy at [facility] for postural restoration therapy.    3.  MEDICATIONS: No changes are made to the patient's medications.  She is not currently taking any pain relieving medications.  She could use ibuprofen if needed.    4.  INTERVENTIONS: We discussed a right sacroiliac joint injection.  Patient will consider this if symptoms fail to improve.  She would like to try additional conservative treatments first.    5.  PATIENT EDUCATION: Patient is in agreement the above plan.  All questions were answered.  -I told the patient she could look into a sacroiliac belt to be worn as needed to  help with pain management.    6.  FOLLOW-UP:   Patient is going to follow-up with me in about 6 weeks to monitor progress with physical therapy.  If she has questions or concerns in the meantime, she should not hesitate to call.      SUBJECTIVE:  Laney Flood  Is a 42 year old female who presents today in consultation at the request of Dr. Beach for new patient evaluation of chronic right upper buttock pain.  Patient reports that she began to have pain December 2021 after a slip and fall on ice.  Pain persisted so she went to physical therapy which was somewhat helpful, but she continues to experience frequent pain.  She has episodes of more severe flares that can be debilitating.  Most recent severe flare was in January 2023.    Patient complains of right upper buttock pain.  Patient points with 1 finger to the right SI joint when asked to point to the pain.  She describes the pain as a nagging pain which is occasionally sharp with certain movements.  The pain can radiate up to the lower lumbar region and occasionally radiates into the buttock and posterior thigh.  Denies pain distal to the knee.  In January she experienced some pain in the left buttock but otherwise it has always been on the right side.  Pain is aggravated with sitting too long and doing too many hip stretches.  Pain is sometimes alleviated with yoga and using a massage gun.  She feels a weak sensation in the right leg when she sits for too long.  She denies loss of bowel or bladder control.  Denies recent fevers, chills, sweats.    Treatment to date:  - Physical therapy times 6 ending September 26, 2022 which provided slight relief of pain  - Chiropractic treatment which is helpful  - No spine surgeries  - No spine injections  - Ibuprofen as needed which helps somewhat.    Current Outpatient Medications   Medication     VITAMIN D, CHOLECALCIFEROL, PO     No current facility-administered medications for this visit.       No Known  Allergies    Past medical history: None        Past Surgical History:   Procedure Laterality Date     HYMENECTOMY  2006       Family History   Problem Relation Age of Onset     Thyroid Disease Mother      Hypertension Father      Asthma Brother      Hypothyroidism Mother      Hypertension Mother      Heart Failure Father      Hyperlipidemia Father      Coronary Artery Disease Father         (MINOR)     Asthma Brother      Cancer Maternal Grandmother      Heart Disease Maternal Grandfather      Coronary Artery Disease Paternal Grandmother      Coronary Artery Disease Paternal Grandfather        Social history: Patient is .  She works as a .  She drinks 1 alcoholic beverage per week.  Denies tobacco use.  Denies illicit drug use.      ROS: Positive for headache.  Specifically negative for bowel/bladder dysfunction, fevers,chills, appetite changes, unexplained weight loss.   Otherwise 13 systems reviewed are negative.  Please see the patient's intake questionnaire from today for details.      OBJECTIVE:  PHYSICAL EXAMINATION:    CONSTITUTIONAL:  Vital signs as above.  No acute distress.  The patient is well nourished and well groomed.  PSYCHIATRIC:  The patient is awake, alert, oriented to person, place, time and answering questions appropriately with clear speech.    HEENT: Normocephalic, atraumatic.  Sclera clear.  Neck is supple.  SKIN:  Skin over the face, bilateral lower extremities, and posterior torso is clean, dry, intact without rashes.    GAIT:  Gait is non-antalgic.  The patient is able to heel and toe walk without significant difficulty.    STANDING EXAMINATION:   Mild tenderness palpation right L5-S1 paraspinous muscle.  Mild tenderness palpation right sacroiliac joint.  Mild tenderness palpation bilateral greater trochanters.  Lumbar range of motion is full with flexion and extension.  MUSCLE STRENGTH:  The patient has 5/5 strength for the bilateral hip flexors, knee  flexors/extensors, ankle dorsiflexors/plantar flexors, great toe extensors, ankle evertors/invertors.  NEUROLOGICAL:  2/4 patellar, and achilles reflexes bilaterally.  Negative Babinski's bilaterally.  No ankle clonus bilaterally. Sensation to light touch is intact in the bilateral L4, L5, and S1 dermatomes.  VASCULAR:  2/4 dorsalis pedis pulses bilaterally.  Bilateral lower extremities are warm.  There is no pitting edema of the bilateral lower extremities.  ABDOMINAL:  Soft, non-distended, non-tender throughout all quadrants.  No pulsatile mass palpated in the left lower quadrant.  LYMPH NODES:  No palpable or tender inguinal lymph nodes.  MUSCULOSKELETAL: Negative pelvic distraction test.  Negative thigh thrust bilaterally.  Negative Matias's test bilaterally.  Positive Gaenslen's test bilaterally reproducing right SI joint pain.  Negative Yeoman's test.  Positive Michelle finger test.  Range of motion of the hips are full.    RESULTS:    I reviewed the x-ray sacroiliac joints from [facility] dated January 9, 2023.  These are normal.  No erosive change.  No fracture.    I reviewed the x-ray lumbar spine from [facility] dated January 9, 2023.  This shows minor disc space narrowing L2-3 and L5-S1 and minor L5-S1 facet arthropathy.

## 2023-03-03 ENCOUNTER — MYC MEDICAL ADVICE (OUTPATIENT)
Dept: FAMILY MEDICINE | Facility: CLINIC | Age: 43
End: 2023-03-03
Payer: COMMERCIAL

## 2023-03-03 DIAGNOSIS — R10.31 RLQ ABDOMINAL PAIN: Primary | ICD-10-CM

## 2023-03-09 ENCOUNTER — LAB (OUTPATIENT)
Dept: LAB | Facility: CLINIC | Age: 43
End: 2023-03-09
Payer: COMMERCIAL

## 2023-03-09 DIAGNOSIS — M53.3 PAIN OF RIGHT SACROILIAC JOINT: Primary | ICD-10-CM

## 2023-03-09 DIAGNOSIS — R10.31 RLQ ABDOMINAL PAIN: ICD-10-CM

## 2023-03-09 LAB
ALBUMIN UR-MCNC: NEGATIVE MG/DL
APPEARANCE UR: CLEAR
BACTERIA #/AREA URNS HPF: ABNORMAL /HPF
BILIRUB UR QL STRIP: NEGATIVE
COLOR UR AUTO: YELLOW
ERYTHROCYTE [DISTWIDTH] IN BLOOD BY AUTOMATED COUNT: 11.9 % (ref 10–15)
GLUCOSE UR STRIP-MCNC: NEGATIVE MG/DL
HCT VFR BLD AUTO: 35.9 % (ref 35–47)
HGB BLD-MCNC: 12.1 G/DL (ref 11.7–15.7)
HGB UR QL STRIP: ABNORMAL
KETONES UR STRIP-MCNC: NEGATIVE MG/DL
LEUKOCYTE ESTERASE UR QL STRIP: NEGATIVE
MCH RBC QN AUTO: 30.7 PG (ref 26.5–33)
MCHC RBC AUTO-ENTMCNC: 33.7 G/DL (ref 31.5–36.5)
MCV RBC AUTO: 91 FL (ref 78–100)
NITRATE UR QL: NEGATIVE
PH UR STRIP: 6.5 [PH] (ref 5–8)
PLATELET # BLD AUTO: 285 10E3/UL (ref 150–450)
RBC # BLD AUTO: 3.94 10E6/UL (ref 3.8–5.2)
RBC #/AREA URNS AUTO: ABNORMAL /HPF
SP GR UR STRIP: 1.01 (ref 1–1.03)
SQUAMOUS #/AREA URNS AUTO: ABNORMAL /LPF
UROBILINOGEN UR STRIP-ACNC: 0.2 E.U./DL
WBC # BLD AUTO: 7.2 10E3/UL (ref 4–11)
WBC #/AREA URNS AUTO: ABNORMAL /HPF

## 2023-03-09 PROCEDURE — 85027 COMPLETE CBC AUTOMATED: CPT

## 2023-03-09 PROCEDURE — 36415 COLL VENOUS BLD VENIPUNCTURE: CPT

## 2023-03-09 PROCEDURE — 81001 URINALYSIS AUTO W/SCOPE: CPT

## 2023-03-10 ENCOUNTER — HOSPITAL ENCOUNTER (OUTPATIENT)
Dept: PHYSICAL THERAPY | Facility: REHABILITATION | Age: 43
Discharge: HOME | End: 2023-03-10
Attending: PHYSICIAN ASSISTANT
Payer: COMMERCIAL

## 2023-03-10 DIAGNOSIS — M53.3 PAIN OF RIGHT SACROILIAC JOINT: ICD-10-CM

## 2023-03-10 PROCEDURE — 97161 PT EVAL LOW COMPLEX 20 MIN: CPT | Mod: GP | Performed by: PHYSICAL THERAPIST

## 2023-03-10 PROCEDURE — 97112 NEUROMUSCULAR REEDUCATION: CPT | Mod: GP | Performed by: PHYSICAL THERAPIST

## 2023-03-10 NOTE — PROGRESS NOTES
"   03/10/23 5822   General Information   Type of Visit Initial OP Ortho PT Evaluation   Start of Care Date 03/10/23   Referring Physician MYRNA Moise   Patient/Family Goals Statement \"get some exercises to prevent me from having SI joint/glute episodes that lay me up, or cause pain.\"   Orders Evaluate and Treat   Date of Order 02/20/23   Certification Required? No   Medical Diagnosis Pain of R Sacrolilic Joint   Surgical/Medical history reviewed Yes   Body Part(s)   Body Part(s) Lumbar Spine/SI   Presentation and Etiology   Pertinent history of current problem (include personal factors and/or comorbidities that impact the POC) Laney slipped and fell in December of 2021. She then felt something immediately in the R SI region (she points), but kept angie to the store. She was sore for a week, then did some stretches.A couple weeks later she felt a pull in the R SI region when shaving her legs in the shower. She says this felt like sharp pain, but does not recall a tearing sensation. She was laid up after that, shetalked to her chiro, he said ice it and get a massage. The massage helped she says, six sessions. Then went to chiro and that also helped and then felt that she was doing pretty good; this was now Jan of 22. She had a physical in March, told her DRMarc about this fall and sequelae, she voiced concerns about what she should and should not do.  recommended PT. The PT thought it was more of a gluteus medius strain. The PT did help, but then later had another episode of pain, went back to PT, that then seemed to 'go OK\" but another episode came afte stopping PT. Then in December or January of this year, after yoga class following holidays, picked up laundry, after 5 hours in car, reaching down for laundy and felt something in the same area as in the shower much earlier. She says that previous PT focused on glutes, but she feels that when her hip or SI felt really tight. She then saw her DrMarc, got x-rays, " "then went to see Dianne Jonesbrennon on Feb. 20th.   Impairments A. Pain;B. Decreased WB tolerance;D. Decreased ROM;E. Decreased flexibility;F. Decreased strength and endurance;H. Impaired gait   Pain rating (0-10 point scale) Worst (/10)   Worst (/10) 10   Pain quality A. Sharp;C. Aching;D. Burning   Fall Risk Screen   Fall screen completed by PT   Have you fallen 2 or more times in the past year? No   Have you fallen and had an injury in the past year? No   Is patient a fall risk? No   Abuse Screen (yes response referral indicated)   Feels Unsafe at Home or Work/School no   Feels Threatened by Someone no   Does Anyone Try to Keep You From Having Contact with Others or Doing Things Outside Your Home? no   Physical Signs of Abuse Present no   System Outcome Measures   Outcome Measures   (Oswestry 28%)   Lumbar Spine/SI Objective Findings   Gait/Locomotion limited R arm swing, R foot turned out   Balance/Proprioception (Single Leg Stance) WNL   Hamstring Flexibility LImited L   Hip Flexor Flexibility Neg Bilateral   Obers Flexibility + BL L>R   Flexion ROM Standing reach test:-3\"   Extension ROM WNL   Right Side Bending ROM WNL   Left Side Bending ROM WNL   Lumbar ROM Comment Quadrants grossly normal, with no provocation of concordant symptom   Pelvic Screen Michelle Finger Test +; SI battery -; not painful to palpation long dorsal sacroiliac ligament   Lumbar/Hip/Knee/Foot Strength Comments Dermatomes and myotomes clear   Palpation GH IR; 65 R; 70 L; bilatreal limitation apical lung expansion   Posture mild forward head, bilateral rounded shoulders, bilateral mild rib flares, flat upper thoracic, R shoulder low   Planned Therapy Interventions   Planned Therapy Interventions manual therapy;neuromuscular re-education   Clinical Impression   Criteria for Skilled Therapeutic Interventions Met yes, treatment indicated   PT Diagnosis low back/SI joint pain   Influenced by the following impairments pain, impaired posture   Functional " limitations due to impairments sitting tolerance, standing tolerance, social activity, ADLs   Clinical Presentation Stable/Uncomplicated   Clinical Decision Making (Complexity) Low complexity   Therapy Frequency 1 time/week   Predicted Duration of Therapy Intervention (days/wks) 8   Risk & Benefits of therapy have been explained Yes   Patient, Family & other staff in agreement with plan of care Yes   Clinical Impression Comments Laney is referred to physical therapy with diagnosis of pain at R SI joint. Clinical testing today positive only for Michelle Finger Test. Long dorsal SI ligament not TTP today. Patient presents with bilateral posterior exterior chain, LAIC and BL BC patterns in the postural restoration scheme. The L AIC pattern does correlate with R SI joint instability and pain, and so patient may benefit from postural restoration treatment to restore muscle symmetry and resolve muscle imbalances in the pelvis and trunk.   ORTHO GOALS   PT Ortho Eval Goals 1;2;3   Ortho Goal 1   Goal Identifier Oswestry   Goal Description Patient will score <10% on Oswestry   Target Date 05/12/23   Ortho Goal 2   Goal Identifier Sitting tolerance   Goal Description Patient will reprot ability to sit without pain for one entire work week at the office.   Target Date 05/12/23   Ortho Goal 3   Goal Identifier Standing tolerance   Goal Description Patient will report abiltiy to stand for 3 hours without pain provocation.   Target Date 05/12/23   Total Evaluation Time   PT Eval, Low Complexity Minutes (44287) 20

## 2023-03-16 ASSESSMENT — ENCOUNTER SYMPTOMS
ARTHRALGIAS: 0
HEARTBURN: 0
HEMATURIA: 0
FEVER: 0
NERVOUS/ANXIOUS: 0
MYALGIAS: 0
SORE THROAT: 0
HEADACHES: 0
CONSTIPATION: 1
DIZZINESS: 0
PARESTHESIAS: 0
EYE PAIN: 0
FREQUENCY: 0
COUGH: 0
DYSURIA: 0
JOINT SWELLING: 0
BREAST MASS: 0
DIARRHEA: 0
ABDOMINAL PAIN: 1
HEMATOCHEZIA: 0
NAUSEA: 0
PALPITATIONS: 0
SHORTNESS OF BREATH: 0
WEAKNESS: 0
CHILLS: 0

## 2023-03-17 ENCOUNTER — OFFICE VISIT (OUTPATIENT)
Dept: FAMILY MEDICINE | Facility: CLINIC | Age: 43
End: 2023-03-17
Payer: COMMERCIAL

## 2023-03-17 VITALS
WEIGHT: 160 LBS | DIASTOLIC BLOOD PRESSURE: 70 MMHG | HEART RATE: 93 BPM | HEIGHT: 67 IN | RESPIRATION RATE: 17 BRPM | SYSTOLIC BLOOD PRESSURE: 110 MMHG | TEMPERATURE: 97.8 F | BODY MASS INDEX: 25.11 KG/M2 | OXYGEN SATURATION: 99 %

## 2023-03-17 DIAGNOSIS — Z80.3 FAMILY HISTORY OF MALIGNANT NEOPLASM OF BREAST: ICD-10-CM

## 2023-03-17 DIAGNOSIS — G89.29 CHRONIC BILATERAL LOW BACK PAIN WITH RIGHT-SIDED SCIATICA: ICD-10-CM

## 2023-03-17 DIAGNOSIS — M54.41 CHRONIC BILATERAL LOW BACK PAIN WITH RIGHT-SIDED SCIATICA: ICD-10-CM

## 2023-03-17 DIAGNOSIS — Z00.00 ROUTINE GENERAL MEDICAL EXAMINATION AT A HEALTH CARE FACILITY: Primary | ICD-10-CM

## 2023-03-17 DIAGNOSIS — R10.2 PELVIC PAIN IN FEMALE: ICD-10-CM

## 2023-03-17 PROCEDURE — 99396 PREV VISIT EST AGE 40-64: CPT | Performed by: FAMILY MEDICINE

## 2023-03-17 ASSESSMENT — ENCOUNTER SYMPTOMS
JOINT SWELLING: 0
WEAKNESS: 0
BREAST MASS: 0
HEADACHES: 0
PALPITATIONS: 0
FEVER: 0
HEMATOCHEZIA: 0
ABDOMINAL PAIN: 1
HEARTBURN: 0
SORE THROAT: 0
DYSURIA: 0
CONSTIPATION: 1
PARESTHESIAS: 0
ARTHRALGIAS: 0
COUGH: 0
NERVOUS/ANXIOUS: 0
HEMATURIA: 0
NAUSEA: 0
CHILLS: 0
DIARRHEA: 0
MYALGIAS: 0
EYE PAIN: 0
SHORTNESS OF BREATH: 0
DIZZINESS: 0
FREQUENCY: 0

## 2023-03-17 NOTE — PATIENT INSTRUCTIONS
Check insurance coverage for coverage of Hepatitis B vaccine , genetic testing, and mammogram at age forty   Preventive Health Recommendations  Female Ages 40 to 49    Yearly exam:     See your health care provider every year in order to  1. Review health changes.   2. Discuss preventive care.    3. Review your medicines if your doctor prescribed any.      Get a Pap test every three years (unless you have an abnormal result and your provider advises testing more often).      If you get Pap tests with HPV test, you only need to test every 5 years, unless you have an abnormal result. You do not need a Pap test if your uterus was removed (hysterectomy) and you have not had cancer.      You should be tested each year for STDs (sexually transmitted diseases), if you're at risk.     Ask your doctor if you should have a mammogram.      Have a colonoscopy (test for colon cancer) if someone in your family has had colon cancer or polyps before age 50.       Have a cholesterol test every 5 years.       Have a diabetes test (fasting glucose) after age 45. If you are at risk for diabetes, you should have this test every 3 years.    Shots: Get a flu shot each year. Get a tetanus shot every 10 years.     Nutrition:     Eat at least 5 servings of fruits and vegetables each day.    Eat whole-grain bread, whole-wheat pasta and brown rice instead of white grains and rice.    Get adequate Calcium and Vitamin D.      Lifestyle    Exercise at least 150 minutes a week (an average of 30 minutes a day, 5 days a week). This will help you control your weight and prevent disease.    Limit alcohol to one drink per day.    No smoking.     Wear sunscreen to prevent skin cancer.    See your dentist every six months for an exam and cleaning.

## 2023-03-17 NOTE — PROGRESS NOTES
"    ASSESSMENT/PLAN:   Laney was seen today for physical.    Diagnoses and all orders for this visit:    Routine general medical examination at a health care facility    Right lower abdominal/ Pelvic pain in female  No acute clear cause, urinalysis normal, bowel movements normal.  No clear event preceding onset.  Some change in her menses-more spotting before menses .  Pelvic exam normal.  Abdominal exam mild tenderness low right lower quadrant.  Evaluate with  -     US Pelvic Complete with Transvaginal; Future  If this is -normal other DDx would include abdominal strain, possibly hernia.  If pain does not improve we could consider CT scan    Chronic bilateral low back pain with right-sided sciatica  -     MR Lumbar Spine w/o Contrast; Future    Family history of malignant neoplasm of breast  -     *MA Screening Digital Bilateral; Future  -     Cancer Risk Mgmt/Cancer Genetic Counseling Referral; Future    Other orders  -     REVIEW OF HEALTH MAINTENANCE PROTOCOL ORDERS              COUNSELING:  Reviewed preventive health counseling, as reflected in patient instructions      BMI:   Estimated body mass index is 25.41 kg/m  as calculated from the following:    Height as of this encounter: 1.69 m (5' 6.54\").    Weight as of this encounter: 72.6 kg (160 lb).         She reports that she has never smoked. She has never used smokeless tobacco.    Christiana Beach MD  [facility]     SUBJECTIVE:   CC: Laney is an 42 year old who presents for preventive health visit.     Patient has been advised of split billing requirements and indicates understanding: Yes  Healthy Habits:     Getting at least 3 servings of Calcium per day:  NO    Bi-annual eye exam:  Yes    Dental care twice a year:  Yes    Sleep apnea or symptoms of sleep apnea:  None    Diet:  Regular (no restrictions)    Frequency of exercise:  4-5 days/week    Duration of exercise:  15-30 minutes    Taking medications regularly:  Yes    " Medication side effects:  None    PHQ-2 Total Score: 0    Additional concerns today:  No    Low back pain : Lakshmi reviews that this all stemmed from a fall in December 2021.  She had some initial physical therapy which helped somewhat, but pain would come back.  I sent her to the spine clinic and they wondered about SI joint pain, and Center for further therapy.  She says its not bothering her at night as much as it used to and she is able to walk more at work.  But it is still annoying.    RLQ  Abdominal/pelvic pain -no clear triggering event.  This comes and goes.  She had messaged me about this and I ordered a urinalysis which was normal.  Improved much last week but she can still feel some pain and she points to her right lower quadrant.  She thinks it is possible the pain got worse with shoveling snow, but she does not remember onset of pain after activity.    She is having regular menses, although over the last 2 to 3 months she has been spotting for the week before she gets her full menses.  She and her  are not using contraception.      Today's PHQ-2 Score:   PHQ-2 ( 1999 Pfizer) 3/16/2023   Q1: Little interest or pleasure in doing things 0   Q2: Feeling down, depressed or hopeless 0   PHQ-2 Score 0   Q1: Little interest or pleasure in doing things Not at all   Q2: Feeling down, depressed or hopeless Not at all   PHQ-2 Score 0       Social History     Tobacco Use     Smoking status: Never     Smokeless tobacco: Never   Substance Use Topics     Alcohol use: Yes     Alcohol Use 3/16/2023   Prescreen: >3 drinks/day or >7 drinks/week? No       Reviewed orders with patient.  Reviewed health maintenance and updated orders accordingly - Yes  Low    Breast Cancer Screening:    FHS-7:   Breast CA Risk Assessment (FHS-7) 2/28/2022   Did any of your first-degree relatives have breast or ovarian cancer? No   Did any of your relatives have bilateral breast cancer? Yes   Did any man in your family have breast  cancer? No   Did any woman in your family have breast and ovarian cancer? No   Did any woman in your family have breast cancer before age 50 y? No   Do you have 2 or more relatives with breast and/or ovarian cancer? No   Do you have 2 or more relatives with breast and/or bowel cancer? No     Cancer genetics recommend mammogram at age 40  MGM - had breast cancer on both sides  Maternal aunt was diagnosed with breast cancer on both sides- she tested negative    Laney did have a virtual visit with cancer genetics last year .but did not actually get genetic testing her memory is that they offered testing for breast cancer but could also go farther.  She was not sure what to make of this and wonders what I thought.  I am not quite sure the meaning, but assume that there are other genetic syndromes (like Acosta syndrome) where breast cancer can be seen, but other cancers are more prominent.  I urged her to follow-up with them        History of abnormal Pap smear: NO - age 30-65 PAP every 5 years with negative HPV co-testing recommended  PAP / HPV Latest Ref Rng & Units 8/25/2020 3/26/2015   PAP Negative for squamous intraepithelial lesion or malignancy. Negative for squamous intraepithelial lesion or malignancy  Electronically signed by Laney Spence CT (ASCP) on 9/4/2020 at  8:56 AM   -   PAP (Historical) - - NIL   HPV16 NEG Negative Negative   HPV18 NEG Negative Negative   HRHPV NEG Negative Negative     Reviewed and updated as needed this visit by clinical staff   Tobacco  Allergies  Meds              Reviewed and updated as needed this visit by Provider                     Review of Systems   Constitutional: Negative for chills and fever.   HENT: Negative for congestion, ear pain, hearing loss and sore throat.    Eyes: Negative for pain and visual disturbance.   Respiratory: Negative for cough and shortness of breath.    Cardiovascular: Negative for chest pain, palpitations and peripheral edema.  "  Gastrointestinal: Positive for abdominal pain and constipation. Negative for diarrhea, heartburn, hematochezia and nausea.   Breasts:  Negative for tenderness, breast mass and discharge.   Genitourinary: Negative for dysuria, frequency, genital sores, hematuria, pelvic pain, urgency, vaginal bleeding and vaginal discharge.   Musculoskeletal: Negative for arthralgias, joint swelling and myalgias.   Skin: Negative for rash.   Neurological: Negative for dizziness, weakness, headaches and paresthesias.   Psychiatric/Behavioral: Negative for mood changes. The patient is not nervous/anxious.           OBJECTIVE:   /70   Pulse 93   Temp 97.8  F (36.6  C) (Tympanic)   Resp 17   Ht 1.69 m (5' 6.54\")   Wt 72.6 kg (160 lb)   LMP 03/09/2023   SpO2 99%   BMI 25.41 kg/m    Physical Exam   General appearance - alert, well appearing, and in no distress  Mental status - normal mood, behavior, speech, dress, motor activity, and thought processes  Eyes - pupils equal and reactive, extraocular eye movements intact, funduscopic exam normal, discs flat and sharp  Ears - bilateral TM's and external ear canals normal  Mouth - mucous membranes moist, pharynx normal without lesions  Neck - supple, no significant adenopathy, carotids upstroke normal bilaterally, no bruits, thyroid exam: thyroid is normal in size without nodules or tenderness  Chest - clear to auscultation, no wheezes, rales or rhonchi, symmetric air entry  Heart - normal rate, regular rhythm, normal S1, S2, no murmurs, rubs, clicks or gallops  Abdomen - soft, nontender, nondistended, no masses or organomegaly  Breasts - breasts appear normal, no suspicious masses, no skin or nipple changes or axillary nodes  Pelvic exam: normal vagina and vulva, uterus normal size anteverted, adenxa normal in size without tenderness.  Neurological - alert, oriented, normal speech, no focal findings or movement disorder noted, DTR's normal and symmetric  Extremities - " peripheral pulses normal, no pedal edema, no clubbing or cyanosis  Skin - no rashes or worrisome lesions

## 2023-03-20 ENCOUNTER — HOSPITAL ENCOUNTER (OUTPATIENT)
Dept: MRI IMAGING | Facility: HOSPITAL | Age: 43
Discharge: HOME | End: 2023-03-20
Attending: FAMILY MEDICINE
Payer: COMMERCIAL

## 2023-03-20 ENCOUNTER — HOSPITAL ENCOUNTER (OUTPATIENT)
Dept: ULTRASOUND IMAGING | Facility: HOSPITAL | Age: 43
Discharge: HOME | End: 2023-03-20
Attending: FAMILY MEDICINE
Payer: COMMERCIAL

## 2023-03-20 DIAGNOSIS — M54.41 CHRONIC BILATERAL LOW BACK PAIN WITH RIGHT-SIDED SCIATICA: ICD-10-CM

## 2023-03-20 DIAGNOSIS — G89.29 CHRONIC BILATERAL LOW BACK PAIN WITH RIGHT-SIDED SCIATICA: ICD-10-CM

## 2023-03-20 DIAGNOSIS — R10.2 PELVIC PAIN IN FEMALE: ICD-10-CM

## 2023-03-20 PROCEDURE — 72148 MRI LUMBAR SPINE W/O DYE: CPT

## 2023-03-20 PROCEDURE — 76856 US EXAM PELVIC COMPLETE: CPT

## 2023-03-22 ENCOUNTER — MYC MEDICAL ADVICE (OUTPATIENT)
Dept: FAMILY MEDICINE | Facility: CLINIC | Age: 43
End: 2023-03-22
Payer: COMMERCIAL

## 2023-03-23 ENCOUNTER — MYC MEDICAL ADVICE (OUTPATIENT)
Dept: FAMILY MEDICINE | Facility: CLINIC | Age: 43
End: 2023-03-23
Payer: COMMERCIAL

## 2023-03-23 DIAGNOSIS — R10.31 RLQ ABDOMINAL PAIN: Primary | ICD-10-CM

## 2023-03-26 ENCOUNTER — MYC MEDICAL ADVICE (OUTPATIENT)
Dept: FAMILY MEDICINE | Facility: CLINIC | Age: 43
End: 2023-03-26
Payer: COMMERCIAL

## 2023-03-26 DIAGNOSIS — Z23 IMMUNIZATION DUE: Primary | ICD-10-CM

## 2023-03-29 ENCOUNTER — MYC MEDICAL ADVICE (OUTPATIENT)
Dept: FAMILY MEDICINE | Facility: CLINIC | Age: 43
End: 2023-03-29

## 2023-03-29 ENCOUNTER — HOSPITAL ENCOUNTER (OUTPATIENT)
Dept: CT IMAGING | Facility: HOSPITAL | Age: 43
Discharge: HOME | End: 2023-03-29
Attending: FAMILY MEDICINE | Admitting: FAMILY MEDICINE
Payer: COMMERCIAL

## 2023-03-29 DIAGNOSIS — R10.31 RLQ ABDOMINAL PAIN: ICD-10-CM

## 2023-03-29 DIAGNOSIS — R10.2 PELVIC PAIN IN FEMALE: ICD-10-CM

## 2023-03-29 DIAGNOSIS — R10.31 RLQ ABDOMINAL PAIN: Primary | ICD-10-CM

## 2023-03-29 PROCEDURE — 74177 CT ABD & PELVIS W/CONTRAST: CPT

## 2023-03-29 PROCEDURE — 250N000011 HC RX IP 250 OP 636: Performed by: FAMILY MEDICINE

## 2023-03-29 RX ORDER — IOPAMIDOL 755 MG/ML
100 INJECTION, SOLUTION INTRAVASCULAR ONCE
Status: COMPLETED | OUTPATIENT
Start: 2023-03-29 | End: 2023-03-29

## 2023-03-29 RX ADMIN — IOPAMIDOL 100 ML: 755 INJECTION, SOLUTION INTRAVENOUS at 17:21

## 2023-03-30 NOTE — PROGRESS NOTES
Virtual Visit Details    Type of service:  Video Visit     Joined the call at 3/31/2023, 8:03:31 am.  Left the call at 3/31/2023, 8:49:57 am.  Originating Location (pt. Location): Home  Distant Location (provider location):  Off-site  Platform used for Video Visit: Angel    Joined by mom (Yennifer) and sister (Maria T)      3/31/2023    Referring Provider: Christiana Beach MD    Presenting Information:   Laney Flood returned to clinic (virtual visit) for a follow-up visit today. She had previously been seen in the Cancer Risk Management Program for genetic counseling on 5/17/2022. She is here today to revisit testing options and cancer screening recommendations due to her family history of breast cancer.    Family History: (Please see scanned pedigree for detailed family history information)  ? Laney's maternal grandmother was diagnosed with breast cancer in her late 50's, and a second breast cancer in her 60's-70's (reported as a second primary).  She also had a history of small cell lung cancer, and passed away at age 88  ? One maternal aunt was diagnosed with breast cancer at age 67  ? One maternal uncle passed away at age 30, and was diagnosed with sarcoma at age 24  ? Laney has a maternal first cousin whose son had a diagnosis of neuroblastoma  ? Her father is in his 70's and has had basal cell carcinoma  ? Her ethnicity is [place]/English.     Discussion:    We previously reviewed the details of Laney's family and personal history. Please see the clinic note from our previous appointment for details.      Laney's family history of breast cancer (including her maternal grandmother and maternal aunt) may be suggestive of a hereditary cancer syndrome.  We reviewed the features of sporadic, familial, and hereditary cancers.  Based on her family history of three instances of breast cancer, Laney meets current National Comprehensive Cancer Network (NCCN) criteria for genetic testing of high penetrance breast  cancer susceptibility genes (including BRCA1, BRCA2, among others).      We discussed the natural history and genetics of hereditary breast and ovarian cancer. A detailed handout regarding hereditary breast cancer and the information we discussed can be found in the after visit summary. Topics included: inheritance pattern, cancer risks, cancer screening recommendations, and also risks, benefits and limitations of testing.    We discussed that there are additional genes that could cause increased risk for breast, and/or other cancers as well. As many of these genes present with overlapping features in a family and accurate cancer risk cannot always be established based upon the pedigree analysis alone, it would be reasonable for Laney to consider panel genetic testing to analyze multiple genes at once.  Laney elected to proceed with genetic testing for genes most associated with the cancers in her family.     Verbal consent was obtained and genetic testing for BRCA1/2 and a custom hereditary cancers panel will be  was sent to [facility]. Test turn around time: 3-4 weeks.      Medical Management: For Laney, we reviewed that the information from genetic testing may determine:    additional cancer screening for which Laney may qualify (i.e. mammogram and breast MRI, more frequent colonoscopies, more frequent dermatologic exams, etc.),    options for risk reducing surgeries Laney could consider (i.e. bilateral mastectomy, surgery to remove the ovaries and/or uterus, etc.),      and possible targeted therapies in the future (i.e. immunotherapy for individuals with Acosta syndrome, PARP inhibitors, etc.).     These recommendations and possible targeted chemotherapies will be discussed in detail once genetic testing is completed.     Plan:  Today Laney elected to proceed with BRCA1/2 testing and a custom hereditary breast, gynecologic, colorectal and sarcoma panel through [facility].  A prior  authorization will be submitted. A follow up appointment will be scheduled to discuss the results once completed (test turn around time: 3-4 weeks after blood draw).      Negra Fernandes MS, [facility]  Licensed, Certified Genetic Counselor  [facility]  Phone: [phone]    Genetic Testing Next Steps:    1. An order for genetic testing will be placed by your genetic counselor, and a blood draw will be coordinated through an [facility] clinic  2. You will be contacted by [facility] if the estimated out of pocket cost exceeds $100.  This price depends on your insurance benefits (including coverage and remaining deductible).   Once notified, please contact the testing laboratory to confirm your out of pocket cost.  Additional pricing and payment options may be available.  3. Results from your genetic test will be available in approximately 2-4 weeks, though may take longer depending on insurance.  A follow up appointment will be scheduled with your genetic counselor to discuss these results and medical management options.  Your genetic test results may not appear in [facility]; please contact your genetic counselor if you would prefer to obtain an electronic copy prior to your appointment.

## 2023-03-31 ENCOUNTER — VIRTUAL VISIT (OUTPATIENT)
Dept: ONCOLOGY | Facility: CLINIC | Age: 43
End: 2023-03-31
Attending: GENETIC COUNSELOR, MS
Payer: COMMERCIAL

## 2023-03-31 DIAGNOSIS — Z80.3 FAMILY HISTORY OF MALIGNANT NEOPLASM OF BREAST: Primary | ICD-10-CM

## 2023-03-31 DIAGNOSIS — Z80.9 FAMILY HISTORY OF CANCER: ICD-10-CM

## 2023-03-31 PROCEDURE — 96040 HC GENETIC COUNSELING, EACH 30 MINUTES: CPT | Mod: GT | Performed by: GENETIC COUNSELOR, MS

## 2023-03-31 NOTE — LETTER
3/31/2023         RE: Laney Flood  [address]  [place] [address]        Dear Colleague,    Thank you for referring your patient, Laney Flood, to the [facility]. Please see a copy of my visit note below.    Virtual Visit Details    Type of service:  Video Visit     Joined the call at 3/31/2023, 8:03:31 am.  Left the call at 3/31/2023, 8:49:57 am.  Originating Location (pt. Location): Home  Distant Location (provider location):  Off-site  Platform used for Video Visit: Angel    Joined by mom (Yennifer) and sister (Maria T)      3/31/2023    Referring Provider: Christiana Beach MD    Presenting Information:   Laney Flood returned to clinic (virtual visit) for a follow-up visit today. She had previously been seen in the Cancer Risk Management Program for genetic counseling on 5/17/2022. She is here today to revisit testing options and cancer screening recommendations due to her family history of breast cancer.    Family History: (Please see scanned pedigree for detailed family history information)  Laney's maternal grandmother was diagnosed with breast cancer in her late 50's, and a second breast cancer in her 60's-70's (reported as a second primary).  She also had a history of small cell lung cancer, and passed away at age 88  One maternal aunt was diagnosed with breast cancer at age 67  One maternal uncle passed away at age 30, and was diagnosed with sarcoma at age 24  Laney has a maternal first cousin whose son had a diagnosis of neuroblastoma  Her father is in his 70's and has had basal cell carcinoma  Her ethnicity is [place]/English.     Discussion:  We previously reviewed the details of Laney's family and personal history. Please see the clinic note from our previous appointment for details.    Laney's family history of breast cancer (including her maternal grandmother and maternal aunt) may be suggestive of a hereditary cancer syndrome.  We reviewed the  features of sporadic, familial, and hereditary cancers.  Based on her family history of three instances of breast cancer, Laney meets current National Comprehensive Cancer Network (NCCN) criteria for genetic testing of high penetrance breast cancer susceptibility genes (including BRCA1, BRCA2, among others).    We discussed the natural history and genetics of hereditary breast and ovarian cancer. A detailed handout regarding hereditary breast cancer and the information we discussed can be found in the after visit summary. Topics included: inheritance pattern, cancer risks, cancer screening recommendations, and also risks, benefits and limitations of testing.  We discussed that there are additional genes that could cause increased risk for breast, and/or other cancers as well. As many of these genes present with overlapping features in a family and accurate cancer risk cannot always be established based upon the pedigree analysis alone, it would be reasonable for Laney to consider panel genetic testing to analyze multiple genes at once.  Laney elected to proceed with genetic testing for genes most associated with the cancers in her family.   Verbal consent was obtained and genetic testing for BRCA1/2 and a custom hereditary cancers panel will be  was sent to [facility]. Test turn around time: 3-4 weeks.    Medical Management: For Laney, we reviewed that the information from genetic testing may determine:  additional cancer screening for which Laney may qualify (i.e. mammogram and breast MRI, more frequent colonoscopies, more frequent dermatologic exams, etc.),  options for risk reducing surgeries Laney could consider (i.e. bilateral mastectomy, surgery to remove the ovaries and/or uterus, etc.),    and possible targeted therapies in the future (i.e. immunotherapy for individuals with Acosta syndrome, PARP inhibitors, etc.).   These recommendations and possible targeted chemotherapies will  be discussed in detail once genetic testing is completed.     Plan:  Today Laney elected to proceed with BRCA1/2 testing and a custom hereditary breast, gynecologic, colorectal and sarcoma panel through [facility].  A prior authorization will be submitted. A follow up appointment will be scheduled to discuss the results once completed (test turn around time: 3-4 weeks after blood draw).      Negra Fernandes MS, [facility]  Licensed, Certified Genetic Counselor  [facility]  Phone: [phone]    Genetic Testing Next Steps:    An order for genetic testing will be placed by your genetic counselor, and a blood draw will be coordinated through an [facility] clinic  You will be contacted by [facility] if the estimated out of pocket cost exceeds $100.  This price depends on your insurance benefits (including coverage and remaining deductible).   Once notified, please contact the testing laboratory to confirm your out of pocket cost.  Additional pricing and payment options may be available.  Results from your genetic test will be available in approximately 2-4 weeks, though may take longer depending on insurance.  A follow up appointment will be scheduled with your genetic counselor to discuss these results and medical management options.  Your genetic test results may not appear in [facility]; please contact your genetic counselor if you would prefer to obtain an electronic copy prior to your appointment.

## 2023-03-31 NOTE — NURSING NOTE
Is the patient currently in the state of [place]? YES    Visit mode:VIDEO    If the visit is dropped, the patient can be reconnected by: VIDEO VISIT: Send to e-mail at: [email or web address]    Will anyone else be joining the visit? Mom, Yennifer, and sister, Maria T, will be present      How would you like to obtain your AVS? MyChart    Are changes needed to the allergy or medication list? NO    Reason for visit: LU[facility]

## 2023-04-05 NOTE — PATIENT INSTRUCTIONS
Assessing Cancer Risk  Cancer is a common diagnosis which impacts many families.  Individuals may develop cancer due to environmental factors (such as exposures and lifestyle), aging, genetic predisposition, or a combination of these factors.      Only about 5-10% of cancers are thought to be due to an inherited cancer susceptibility gene.    These families often have:  Several people with the same or related types of cancer  Cancers diagnosed at a young age (before age 50)  Individuals with more than one primary cancer  Multiple generations of the family affected with cancer    Comprehensive Breast and Gynecologic Cancer Panel  We each inherit two copies of every gene in our bodies: one from our mother, and one from our father. Each gene has a specific job to do.  When a gene has a mistake or  mutation  in it, it does not work like it should.     Some people may be candidates for genetic testing of more than one gene.  For these families, genetic testing using a cancer panel may be offered. These panels will test different genes at once known to increase the risk for breast, ovarian, uterine, and/or other cancers.    This handout will review common hereditary breast and gynecologic cancer syndromes. The genes that will be discussed in this handout are: OMI, BRCA1, BRCA2, BRIP1, CDH1, CHEK2, MLH1, MSH2, MSH6, PMS2, EPCAM, PTEN, PALB2, RAD51C, RAD51D, and TP53.    The purpose of this handout is to serve as a brief summary of the breast and gynecologic cancer risk genes that have published clinical management guidelines for individuals who are found to carry a mutation. Inheriting a mutation does not mean a person will develop cancer, but it does significantly increase their risk above the general population risk.     ______________________________________________________________________________    Hereditary Breast and Ovarian Cancer Syndrome (BRCA1 and BRCA2)  A single mutation in one of the copies of BRCA1 or  BRCA2 increases the risk for breast and ovarian cancer, among others.  The risk for pancreatic cancer and melanoma may also be slightly increased in some families.  The chart below shows the chance that someone with a BRCA mutation would develop cancer in his or her lifetime1,2,3,4.       Lifetime Cancer Risks    General Population BRCA1  BRCA2   Breast  12% >60% >60%   Ovarian  1-2% 39-58% 13-29%   Prostate 12% 7-26% 19-61%   Male Breast 0.1% 0.2-1.2% 1.8-7.1%   Pancreas 1-2% Up to 5% 5-10%     A person s ethnic background is also important to consider, as individuals of Ashkenazi Congregation ancestry have a higher chance of having a BRCA gene mutation.  There are three BRCA mutations that occur more frequently in this population.      Acosta Syndrome (MLH1, MSH2, MSH6, PMS2, and EPCAM)  Currently five genes are known to cause Acosta Syndrome: MLH1, MSH2, MSH6, PMS2, and EPCAM.  A single mutation in one of the Acosta Syndrome genes increases the risk for colon, endometrial, ovarian, and stomach cancers.  Other cancers that occur less commonly in Acosta Syndrome include urinary tract, skin, and brain cancers.  The chart below shows the chance that a person with Acosta syndrome would develop cancer in his or her lifetime5.      Lifetime Cancer Risks    General Population Acosta Syndrome   Colon 5% 10-61%   Endometrial 3% 13-57%   Ovarian 1-2% 1-38%   Stomach <1% 1-9%   *Cancer risk varies depending on Acosta syndrome gene found      Cowden Syndrome (PTEN)  Cowden syndrome is a hereditary condition that increases the risk for breast, thyroid, endometrial, colon, and kidney cancer.  Cowden syndrome is caused by a mutation in the PTEN gene.  A single mutation in one of the copies of PTEN causes Cowden syndrome and increases cancer risk.  The chart below shows the chance that someone with a PTEN mutation would develop cancer in their lifetime6,7.  Other benign features seen in some individuals with Cowden syndrome include benign  skin lesions (facial papules, keratoses, lipomas), learning disability, autism, thyroid nodules, colon polyps, and larger head size.     Lifetime Cancer Risks    General Population Cowden   Breast 12% 40-60%*   Thyroid 1% Up to 38%   Renal 1-2% Up to 35%   Endometrial 3% Up to 28%   Colon 5% Up to 9%   Melanoma 2-3% Up to 6%   *Emerging data suggests the risk for breast cancer could be greater than 60%               Li-Fraumeni Syndrome (TP53)  Li-Fraumeni Syndrome (LFS) is a cancer predisposition syndrome caused by a mutation in the TP53 gene. A single mutation in one of the copies of TP53 increases the risk for multiple cancers. Individuals with LFS are at an increased risk for developing cancer at a young age. The lifetime risk for development of a LFS-associated cancer is 50% by age 30 and 90% by age 60.   Core Cancers: Sarcomas, Breast, Brain, Lung, Leukemias/Lymphomas, Adrenocortical carcinomas  Other Cancers: Gastrointestinal, Thyroid, Skin, Genitourinary       Hereditary Diffuse Gastric Cancer (CDH1)  Currently, one gene is known to cause hereditary diffuse gastric cancer (HDGC): CDH1.  Individuals with HDGC are at increased risk for diffuse gastric cancer and lobular breast cancer. Of people diagnosed with HDGC, 30-50% have a mutation in the CDH1 gene.  This suggests there are likely other genes that may cause HDGC that have not been identified yet.      Lifetime Cancer Risks    General Population HDGC   Diffuse Gastric  <1% ~80%   Breast 12% 41-60%       Additional Genes    OMI  OMI is a moderate-risk breast cancer gene. Women who have a mutation in OMI can have between a 2-4 fold increased risk for breast cancer compared to the general population8. OMI mutations have also been associated with increased risk for pancreatic cancer between 5-10%9. Individuals who inherit two OMI mutations have a condition called ataxia-telangiectasia (AT).  This rare autosomal recessive condition affects the nervous system  and immune system, and is associated with progressive cerebellar ataxia beginning in childhood. Individuals with ataxia-telangiectasia often have a weakened immune system and have an increased risk for childhood cancers.    PALB2  Mutations in PALB2 have been shown to increase the risk of breast cancer up to 41-60% in some families; where individuals fall within this risk range is dependent upon family stobemx21. PALB2 mutations have also been associated with increased risk for pancreatic cancer between 5-10%.  Individuals who inherit two PALB2 mutations--one from their mother and one from their father--have a condition called Fanconi Anemia.  This rare autosomal recessive condition is associated with short stature, developmental delay, bone marrow failure, and increased risk for childhood cancers.    CHEK2   CHEK2 is a moderate-risk breast cancer gene.  Women who have a mutation in CHEK2 have around a 2-4 fold increased risk for breast cancer compared to the general population, and this risk may be higher depending upon family history.11,12,13 The risk of colon cancer may be twice as high as the general population risk of colon cancer of 5%. Mutations in CHEK2 have also been shown to increase the risk of other cancers, including prostate, however these cancer risks are currently not well understood.    BRIP1, RAD51C and RAD51D  Mutations in RAD51C and RAD51D have been shown to increase the risk of ovarian cancer and breast cancer 14,. Mutations in BRIP1 have been shown to increase the risk of ovarian cancer and possibly female breast cancer 15 .       Lifetime Cancer Risk    General Population        BRIP1   RAD51C  RAD51D   Breast 12% Not well defined 20-40% 20-40%   Ovarian 1-2% 5-15% 10-15% 10-20%     ______________________________________________________________  Inheritance  All of the cancer syndromes reviewed above are inherited in an autosomal dominant pattern.  This means that if a parent has a mutation,  each of their children will have a 50% chance of inheriting that same mutation. Therefore, each child --male or female-- would have a 50% chance of being at increased risk for developing cancer.    Image obtained from Genetics Home Reference, 2013     Mutations in some genes can occur de bk, which means that a person s mutation occurred for the first time in them and was not inherited from a parent.  Now that they have the mutation, however, it can be passed on to future generations.    Genetic Testing  Genetic testing involves a blood test and will look for any harmful mutations that are associated with increased cancer risk.  If possible, it is recommended that the person(s) who has had cancer be tested before other family members.  That person will give us the most useful information about whether or not a specific gene is associated with the cancer in the family.    Results  There are three possible results of genetic testing:  Positive--a harmful mutation was identified in one or more of the genes  Negative--no mutations were identified in any of the genes tested  Variant of unknown significance--a variation in one of the genes was identified, but it is unclear how this impacts cancer risk in the family    Advantages and Disadvantages   There are advantages and disadvantages to genetic testing.    Advantages  May clarify your cancer risk  Can help you make medical decisions  May explain the cancers in your family  May give useful information to your family members (if you share your results)    Disadvantages  Possible negative emotional impact of learning about inherited cancer risk  Uncertainty in interpreting a negative test result in some situations  Possible genetic discrimination concerns (see below)    Genetic Information Nondiscrimination Act (WINSOME)  The Genetic Information Nondiscrimination Act of 2008 (WINSOME) is a federal law that protects individuals from health insurance or employment discrimination  based on a genetic test result alone (with some exceptions, including employers with fewer than 15 employees, and ).  Although rare, WINSOME  does not cover discrimination protections in terms of life insurance, long term care, or disability insurances.  Visit the National Human [employer] [place] website to learn more.    Reducing Cancer Risk  All of the genes described in this handout have nationally recognized cancer screening guidelines that would be recommended for individuals who test positive.  In addition to increased cancer screening, surgeries may be offered or recommended to reduce cancer risk.  Recommendations are based upon an individual s genetic test result as well as their personal and family history of cancer.    Questions to Think About Regarding Genetic Testing:  What effect will the test result have on me and my relationship with my family members if I have an inherited gene mutation?  If I don t have a gene mutation?  Should I share my test results, and how will my family react to this news, which may also affect them?  Are my children ready to learn new information that may one day affect their own health?    Hereditary Cancer Resources    FORCE: Facing Our Risk of Cancer Empowered facingourrisk.org   Bright Pink bebrightpink.org   Li-Fraumeni Syndrome Association lfsassociation.org   PTEN World PTENworld.com   No stomach for cancer, Inc. nostomachforcancer.org   Stomach cancer relief network Scrnet.org   Collaborative Group of the Americas on Inherited Colorectal Cancer (CGA) cgaicc.com    Cancer Care cancercare.org   American Cancer Society (ACS) cancer.org   National Cancer [place] (NCI) cancer.gov     Please call us if you have any questions or concerns.   Cancer Risk Management Program [phone] ([phone])  Damaso Nicholas, MS [facility]  [phone]  Leslie Lebron, MS, [facility] [phone]  Monet Isaac, MS, [facility]  [phone]  Negra Fenrandes, MS, [facility]  [phone]  Ewa Prasad,  MS, [facility]  [phone]  Rut Lovell, MS, [facility] [phone]  Ann-Marie Cordova, MS, [facility] [phone]    References  Willis Anderson PDP, Jackie S, Sebas NOLEN, Mandeep JE, Abhi JL, Angelica N, Dee H, Anatoly O, Lizbeth A, Pasini B, Radigerald P, Manhector S, Mague DM, Gipson N, Cesar E, Anabella H, Fam E, Julianna J, Gronluzma J, Jaxon B, Tulinius H, Thorlacius S, Eerola H, Nevanlinna H, Rosario K, Danielle OP. Average risks of breast and ovarian cancer associated with BRCA1 or BRCA2 mutations detected in case series unselected for family history: a combined analysis of 222 studies. Am J Hum Lana. 2003;72:1117-30.  Carito N, Mary M, Gualberto G.  BRCA1 and BRCA2 Hereditary Breast and Ovarian Cancer. Gene Reviews online. 2013.  Ted YC, Asif S, Willard G, Vaughn S. Breast cancer risk among male BRCA1 and BRCA2 mutation carriers. J Natl Cancer Inst. 2007;99:1811-4.  Darnell DEGROOT, Priya I, Florian J, Jorge L E, Geo ER, Christel F. Risk of breast cancer in male BRCA2 carriers. J Med Lana. 2010;47:710-1.  National Comprehensive Cancer Network. Clinical practice guidelines in oncology, colorectal cancer screening. Available online (registration required). 2015.  Isidoro MH, Con J, Alicia J, Jason ORTEGA, Nesha MS, Eng C. Lifetime cancer risks in individuals with germline PTEN mutations. Clin Cancer Res. 2012;18:400-7.  Deepak R. Cowden Syndrome: A Critical Review of the Clinical Literature. J Lana . 2009:18:13-27.  Mila ZUNIGA, Adama AMADOR, Chantel S, Keiko P, Radha T, Marleny M, Da B, Dipika H, Araceli R, Fantasma K, Felicita L, Darnell DEGROOT, Mague AMADOR, Daniel DF, Wu MR, The Breast Cancer Susceptibility Collaboration (UK) & Asmita ROMO. OMI mutations that cause ataxia-telangiectasia are breast cancer susceptibility alleles. Nature Genetics. 2006;38:873-875  Jose N , Santos Y, Peggy J, Abby L, Jp CAMARGO , David ML, Lillian S, Evans AG, Arsalan S, Shukri ML, Cher J , Josselyn R, Nani SHANE, Saul  JR, Miles VE, Dayan M, Vojamesstein B, Oziel N, Abilio RH, Heather KW, and Marley AP. OMI mutations in patients with hereditary pancreatic cancer. Cancer Discover. 2012;2:41-46  Maycol DELGADO., et al. Breast-Cancer Risk in Families with Mutations in PALB2. NEJM. 2014; 371(6):497-506.  CHEK2 Breast Cancer Case-Control Consortium. CHEK2*1100delC and susceptibility to breast cancer: A collaborative analysis involving 10,860 breast cancer cases and 9,065 controls from 10 studies. Am J Hum Lana, 74 (2004), pp. 2419-1509  Mario T, Mansi S, Leslie K, et al. Spectrum of Mutations in BRCA1, BRCA2, CHEK2, and TP53 in Families at High Risk of Breast Cancer. BEST. 2006;295(12):9208-7363.   Reji C, Rashmi D, Kanchan ZUNIGA, et al. Risk of breast cancer in women with a CHEK2 mutation with and without a family history of breast cancer. J Clin Oncol. 2011;29:8861-7000.  Song H, Miless E, Ramus SJ, et al. Contribution of germline mutations in the RAD51B, RAD51C, and RAD51D genes to ovarian cancer in the population. J Clin Oncol. 2015;33(26):4128-6135. Doi:10.1200/JCO.2015.61.2408.  Jb T, Micaela DF, Del P, et al. Mutations in BRIP1 confer high risk of ovarian cancer. Barbara Lana. 2011;43(11):5295-6083. doi:10.1038/ng.955.

## 2023-04-21 ENCOUNTER — HOSPITAL ENCOUNTER (OUTPATIENT)
Dept: PHYSICAL THERAPY | Facility: REHABILITATION | Age: 43
Discharge: HOME | End: 2023-04-21
Payer: COMMERCIAL

## 2023-04-21 DIAGNOSIS — G89.29 CHRONIC BILATERAL LOW BACK PAIN WITHOUT SCIATICA: ICD-10-CM

## 2023-04-21 DIAGNOSIS — R10.31 RIGHT LOWER QUADRANT PAIN: ICD-10-CM

## 2023-04-21 DIAGNOSIS — M54.50 CHRONIC BILATERAL LOW BACK PAIN WITHOUT SCIATICA: ICD-10-CM

## 2023-04-21 DIAGNOSIS — M53.3 PAIN OF RIGHT SACROILIAC JOINT: Primary | ICD-10-CM

## 2023-04-21 DIAGNOSIS — K59.00 CONSTIPATION, UNSPECIFIED CONSTIPATION TYPE: ICD-10-CM

## 2023-04-21 PROCEDURE — 97112 NEUROMUSCULAR REEDUCATION: CPT | Mod: GP | Performed by: PHYSICAL THERAPIST

## 2023-04-21 PROCEDURE — 97140 MANUAL THERAPY 1/> REGIONS: CPT | Mod: GP | Performed by: PHYSICAL THERAPIST

## 2023-04-21 PROCEDURE — 97161 PT EVAL LOW COMPLEX 20 MIN: CPT | Mod: GP | Performed by: PHYSICAL THERAPIST

## 2023-04-21 NOTE — PROGRESS NOTES
04/21/23 0700   Signing Clinician's Name / Credentials   Signing clinician's name / credentials Susan Schmitz PT   Session Number   Session Number 1   Ortho Goal 1   Goal Identifier HEp   Goal Description Patient will be independent with home exercise program and self management of symptoms in 12 weeks.   Ortho Goal 2   Goal Identifier sitting, back pain   Goal Description Patient will be able to sit for 2 hours for work or watching a movie without increased symptoms in 12 weeks.   Ortho Goal 3   Goal Identifier constipation, abdominal pain   Goal Description Patient will report 50% improvement in abdominal pain and constipation symptoms in 12 weeks.   Subjective Report   Subjective Report see eval   Objective Measure 1   Details see eval   Neuromuscular Re-education   Neuromuscular re-ed of mvmt, balance, coord, kinesthetic sense, posture, proprioception minutes (42291) 8   Skilled Intervention ther ex   Patient Response good   Treatment Detail reviewed some of previous HEP - bridging, modified pretzel stretch   Manual Therapy   Manual Therapy: Mobilization, MFR, MLD, friction massage minutes (79955) 20   Skilled Intervention MFR, counterstrain   Patient Response good   Treatment Detail pelvic diaphragm, (R) hemipelvis, supine LS traction, (R) HFO-SI, (R) LPL5, sacral release   Education   Learner Patient   Readiness Eager   Method Booklet/handout;Explanation;Demonstration   Response Demonstrates Understanding   Plan   Home program Pt has a previous HEP. reviewed Bridging, modified pretzel stretch,   Plan for next session Continue  with POC, including manual therapy, progression of home program, patient education.   Comments   Comments initial assessment: Patient presents with (R)/(B) low back and post pelvic pain, (R) SI pain, (R) lower quadrant pain, constipation. Onset 12/21 for low back, several months for abdomen. Functional limitations include sitting, constipation, bending, lifting.. Findings include pain  with trunk ROM, tenderness of (R) SI joint and sacrum, decreased hip flexibility, fascial restrictions of abdominal viscera..   Total Session Time   Timed Code Treatment Minutes 28   Total Treatment Time (sum of timed and untimed services) 68   Medicare Claim Information   Medical Diagnosis RLQ abdominal pain, pelvic pain in female   PT Diagnosis SI joint dysfunction, low back pain, (R) LQ pain   Start of Care Date 04/21/23   Onset date of current episode/exacerbation   (12/2021, ref date 3/31/23)

## 2023-04-21 NOTE — ADDENDUM NOTE
Encounter addended by: Susan Schmitz, PT on: 4/21/2023 4:05 PM   Actions taken: Flowsheet accepted, Clinical Note Signed

## 2023-04-21 NOTE — PROGRESS NOTES
04/21/23 0700   General Information   Type of Visit Initial OP Ortho PT Evaluation   Start of Care Date 04/21/23   Referring Physician Christiana Beach MD   Patient/Family Goals Statement strengthen pelvic muscu\les, to help with glute, Si joint pain, constipation   Orders Evaluate and Treat   Orders Comment Susan Schmitz please; normal pelvic ultrasound and CT abdomen pelvis   Date of Order 03/31/23   Medical Diagnosis RLQ abdominal pain, pelvic pain in female   Surgical/Medical history reviewed Yes  No past medical history on file.  Patient Active Problem List   Diagnosis     Pain of right sacroiliac joint     Past Surgical History:   Procedure Laterality Date     HYMENECTOMY  2006        Precautions/Limitations no known precautions/limitations   Presentation and Etiology   Pertinent history of current problem (include personal factors and/or comorbidities that impact the POC) Patient presents to therapy today with complaints of (R) lower abdominal pain, (B) low back, pelvic, (R) SI pain. Pain level 2/10, range 0/10 to 9/10. Reports chronic constipation, flatulence. Date of onset/duration of symptoms is 12/2021 for back pain, 2/23 for abdominal pain. Onset was possibly related to a fall in 12/2021. She had ortho PT with benefit. Has had multiple episodes of similar pain. Symptoms are intermittent, getting better. Patient reports a history of similar symptoms. Patient describes their previous level of function as not limited. Symptoms worsen with shoveling snow, sitting. Symptoms improve with rest/relaxation, heat, ice, home exercises. Previous treatment includes PT. Pelvic US and abdomen CT scan (-). Functional limitations: sitting 1 hour , standing can be painful, walking 3 miles , bending, lifting, sleeping, personal cares, transfers, bladder urgency. Pelvic floor: Incontinence frequency; occasionally. Bowel movements every 1-2 days.  MER 24   Impairments P. Bowel or bladder problems;A. Pain   Functional  Limitations perform activities of daily living;perform required work activities;perform desired leisure / sports activities   Onset date of current episode/exacerbation   (12/2021, ref date 3/31/23)   Chronicity Chronic   Current / Previous Interventions   Diagnostic Tests:   (CT, US normal. MRI: IMPRESSION: 1.  Small, chronic central and right paracentral L5-S1 disc protrusion.2.  Minor mid lumbar degenerative disc changes.)   Prior Level of Function   Functional Level Prior Comment not limited   Current Level of Function   Patient role/employment history A. Employed   Employment Comments    Fall Risk Screen   Fall screen completed by PT   Have you fallen 2 or more times in the past year? No   Have you fallen and had an injury in the past year? No   Is patient a fall risk? No   Abuse Screen (yes response referral indicated)   Feels Threatened by Someone no   Does Anyone Try to Keep You From Having Contact with Others or Doing Things Outside Your Home? no   Lumbar Spine/SI Objective Findings   Posture decreased lumbar lordosis, PSIS/sacral base level in standing. crests level   Flexion ROM WNL   Extension ROM 60-70% (R) SI pain   Right Side Bending ROM mild loss (L) LBP   Left Side Bending ROM mild loss   Hip Screen see note for findings   PROM in degrees    Right Left   Hip Flexion (0-120 ) WNL WNL   Hip Abduction (0-45 ) WNL WNL   Hip External Rotation (0-50 ) WNL WNL        Hip Internal Rotation (0-40 ) 30 22      Palpation see note for visceral/abdominal assessment. Mod tenderness of (R) SI joint, (R) post and medial/inferior sacrum. No tenderness of lumbar paraspinals. Mild tightness of med/lat distal hamstrings.    Abdomen  Colon: fair motility  Cecum: fair motility  Small intestine: fair motility  Sigmoid colon: fair/good motility   Planned Therapy Interventions   Planned Therapy Interventions manual therapy;neuromuscular re-education;ROM;strengthening;stretching   Clinical Impression   Criteria  for Skilled Therapeutic Interventions Met yes, treatment indicated   PT Diagnosis SI joint dysfunction, low back pain, (R) LQ pain   Influenced by the following impairments pain with trunk ROM, tenderness of (R) SI joint and sacrum, decreased hip flexibility, fascial restrictions of abdominal viscera.   Functional limitations due to impairments sitting, constipation, bending, lifting.   Clinical Presentation Stable/Uncomplicated   Clinical Decision Making (Complexity) Low complexity   Therapy Frequency 1 time/week   Predicted Duration of Therapy Intervention (days/wks) up to 12 visits, time frame dependent on appt access.   Risk & Benefits of therapy have been explained Yes   Patient, Family & other staff in agreement with plan of care Yes   Clinical Impression Comments Patient presents with (R)/(B) low back and post pelvic pain, (R) SI pain, (R) lower quadrant pain, constipation. Onset 12/21 for low back, several months for abdomen. Functional limitations include sitting, constipation, bending, lifting.. Findings include pain with trunk ROM, tenderness of (R) SI joint and sacrum, decreased hip flexibility, fascial restrictions of abdominal viscera..   Ortho Goal 1   Goal Identifier HEp   Goal Description Patient will be independent with home exercise program and self management of symptoms in 12 weeks.   Ortho Goal 2   Goal Identifier sitting, back pain   Goal Description Patient will be able to sit for 2 hours for work or watching a movie without increased symptoms in 12 weeks.   Ortho Goal 3   Goal Identifier constipation, abdominal pain   Goal Description Patient will report 50% improvement in abdominal pain and constipation symptoms in 12 weeks.   Total Evaluation Time   PT Eval, Low Complexity Minutes (32705) 40

## 2023-04-24 ENCOUNTER — HOSPITAL ENCOUNTER (OUTPATIENT)
Dept: PHYSICAL THERAPY | Facility: REHABILITATION | Age: 43
Discharge: HOME | End: 2023-04-24
Payer: COMMERCIAL

## 2023-04-24 DIAGNOSIS — R10.31 RIGHT LOWER QUADRANT PAIN: ICD-10-CM

## 2023-04-24 DIAGNOSIS — M53.3 PAIN OF RIGHT SACROILIAC JOINT: Primary | ICD-10-CM

## 2023-04-24 DIAGNOSIS — M54.50 CHRONIC BILATERAL LOW BACK PAIN WITHOUT SCIATICA: ICD-10-CM

## 2023-04-24 DIAGNOSIS — K59.00 CONSTIPATION, UNSPECIFIED CONSTIPATION TYPE: ICD-10-CM

## 2023-04-24 DIAGNOSIS — G89.29 CHRONIC BILATERAL LOW BACK PAIN WITHOUT SCIATICA: ICD-10-CM

## 2023-04-24 PROCEDURE — 97140 MANUAL THERAPY 1/> REGIONS: CPT | Mod: GP | Performed by: PHYSICAL THERAPIST

## 2023-04-24 NOTE — PROGRESS NOTES
04/24/23 0700   Signing Clinician's Name / Credentials   Signing clinician's name / credentials Susan Schmitz PT   Session Number   Session Number 2/12   Progress Note/Recertification   Progress Note Due Date 07/20/23   Ortho Goal 1   Goal Identifier HEp   Goal Description Patient will be independent with home exercise program and self management of symptoms in 12 weeks.   Ortho Goal 2   Goal Identifier sitting, back pain   Goal Description Patient will be able to sit for 2 hours for work or watching a movie without increased symptoms in 12 weeks.   Ortho Goal 3   Goal Identifier constipation, abdominal pain   Goal Description Patient will report 50% improvement in abdominal pain and constipation symptoms in 12 weeks.   Subjective Report   Subjective Report Feeling pretty good today. Minimal pain yesterday. Pain level 0/10. Had a lot of bowel activity on Sat.   Objective Measure 1   Details (R) PSIS and sacral base post in standing. (R) FB test (+).   Objective Measure 2   Details Tenderness of (B) post sacrum, (R) LQ, Mobility restrictions of sacrum   Objective Measure 3   Details Mod tenderness of (R) SI joint.   Neuromuscular Re-education   Skilled Intervention ther ex   Patient Response good   Manual Therapy   Manual Therapy: Mobilization, MFR, MLD, friction massage minutes (07379) 55   Skilled Intervention MFR, counterstrain   Patient Response good. Pelvic landmarks more level post treatment.   Treatment Detail (R) hemipelvis, cecum, CECM-V, colon motility, supine LS traction, iliac gap, (R) FST(P), (L) BST(P), sacral release, (R) LPL5,   Education   Learner Patient   Readiness Eager   Method Booklet/handout;Explanation;Demonstration   Response Demonstrates Understanding   Plan   Home program Pt has a previous HEP. reviewed Bridging, modified pretzel stretch,   Plan for next session Continue  with POC, including manual therapy, progression of home program, patient education.   Comments   Comments initial  assessment: Patient presents with (R)/(B) low back and post pelvic pain, (R) SI pain, (R) lower quadrant pain, constipation. Onset 12/21 for low back, several months for abdomen. Functional limitations include sitting, constipation, bending, lifting.. Findings include pain with trunk ROM, tenderness of (R) SI joint and sacrum, decreased hip flexibility, fascial restrictions of abdominal viscera..   Total Session Time   Timed Code Treatment Minutes 55   Total Treatment Time (sum of timed and untimed services) 55   Medicare Claim Information   Medical Diagnosis RLQ abdominal pain, pelvic pain in female   PT Diagnosis SI joint dysfunction, low back pain, (R) LQ pain   Start of Care Date 04/21/23   Onset date of current episode/exacerbation   (12/2021, ref date 3/31/23)

## 2023-04-26 NOTE — PROGRESS NOTES
Discharge Note    Progress reporting period is from initial evaluation date (please see noted date below) to Sep 26, 2022.  Linked Episodes   Type: Episode: Status: Noted: Resolved: Last update: Updated by:   PHYSICAL THERAPY BIlateral hip pain 3/17/2022 Active 3/17/2022  3/10/2023  8:34 AM Negra Hernandez, PT      Comments:       Laney failed to follow up and current status is unknown.  Please see information below for last relevant information on current status.  Patient seen for 6 visits.    SUBJECTIVE  Subjective changes noted by patient:  Reports she is feeling much better. Just noticing slight R glute pain this morning. Was able to do a stretching work out this morning, this felt good. Has worked on PT exercise.  .  Current pain level is  .     Previous pain level was   .   Changes in function:  Yes (See Goal flowsheet attached for changes in current functional level)  Adverse reaction to treatment or activity: None    OBJECTIVE  Changes noted in objective findings: tolerated full HEP well without increase in pain     ASSESSMENT/PLAN  Diagnosis: R glute med tendinopathy    Updated problem list and treatment plan:   Pain - HEP  Decreased ROM/flexibility - HEP  Decreased function - HEP  Decreased strength - HEP  STG/LTGs have been met or progress has been made towards goals:  Yes, please see goal flowsheet for most current information  Assessment of Progress: current status is unknown.    Last current status: Pt is progressing as expected   Self Management Plans:  HEP  I have re-evaluated this patient and find that the nature, scope, duration and intensity of the therapy is appropriate for the medical condition of the patient.  Laney continues to require the following intervention to meet STG and LTG's:  HEP.    Recommendations:  Discharge with current home program.  Patient to follow up with MD as needed.    Please refer to the daily flowsheet for treatment today, total treatment time and time spent  performing 1:1 timed codes.

## 2023-05-02 ENCOUNTER — ALLIED HEALTH/NURSE VISIT (OUTPATIENT)
Dept: FAMILY MEDICINE | Facility: CLINIC | Age: 43
End: 2023-05-02
Payer: COMMERCIAL

## 2023-05-02 DIAGNOSIS — Z23 IMMUNIZATION DUE: ICD-10-CM

## 2023-05-02 PROCEDURE — 90471 IMMUNIZATION ADMIN: CPT

## 2023-05-02 PROCEDURE — 99207 PR NO CHARGE NURSE ONLY: CPT

## 2023-05-02 PROCEDURE — 90746 HEPB VACCINE 3 DOSE ADULT IM: CPT

## 2023-05-02 NOTE — NURSING NOTE
Prior to immunization administration, verified patients identity using patient s name and date of birth. Please see Immunization Activity for additional information.     Screening Questionnaire for Adult Immunization    Are you sick today?   No   Do you have allergies to medications, food, a vaccine component or latex?   No   Have you ever had a serious reaction after receiving a vaccination?   No   Do you have a long-term health problem with heart, lung, kidney, or metabolic disease (e.g., diabetes), asthma, a blood disorder, no spleen, complement component deficiency, a cochlear implant, or a spinal fluid leak?  Are you on long-term aspirin therapy?   No   Do you have cancer, leukemia, HIV/AIDS, or any other immune system problem?   No   Do you have a parent, brother, or sister with an immune system problem?   No   In the past 3 months, have you taken medications that affect  your immune system, such as prednisone, other steroids, or anticancer drugs; drugs for the treatment of rheumatoid arthritis, Crohn s disease, or psoriasis; or have you had radiation treatments?   No   Have you had a seizure, or a brain or other nervous system problem?   No   During the past year, have you received a transfusion of blood or blood    products, or been given immune (gamma) globulin or antiviral drug?   No   For women: Are you pregnant or is there a chance you could become       pregnant during the next month?   No   Have you received any vaccinations in the past 4 weeks?   No     Immunization questionnaire answers were all negative.    I have reviewed the following standing orders:   This patient is due and qualifies for the Hepatitis B vaccine.    Click here for Hepatitis B Standing Order    I have reviewed the vaccines inclusion and exclusion criteria; No concerns regarding eligibility.   Patient said she had checked with her insurance prior to today's visit.       Injection of Hep B given by Tiffani Varela. Patient instructed  to remain in clinic for 15 minutes afterwards, and to report any adverse reactions.     Screening performed by Tiffani Varela on 5/2/2023 at 11:29 AM.    Scheduled 2nd and 3rd Hep B on MA schedule for future.

## 2023-05-17 ENCOUNTER — ANCILLARY PROCEDURE (OUTPATIENT)
Dept: MAMMOGRAPHY | Facility: CLINIC | Age: 43
End: 2023-05-17
Attending: FAMILY MEDICINE
Payer: COMMERCIAL

## 2023-05-17 DIAGNOSIS — Z12.31 VISIT FOR SCREENING MAMMOGRAM: ICD-10-CM

## 2023-05-17 PROCEDURE — 77067 SCR MAMMO BI INCL CAD: CPT | Mod: TC | Performed by: RADIOLOGY

## 2023-05-17 PROCEDURE — 77063 BREAST TOMOSYNTHESIS BI: CPT | Mod: TC | Performed by: RADIOLOGY

## 2023-05-25 ENCOUNTER — THERAPY VISIT (OUTPATIENT)
Dept: PHYSICAL THERAPY | Facility: REHABILITATION | Age: 43
End: 2023-05-25
Attending: FAMILY MEDICINE
Payer: COMMERCIAL

## 2023-05-25 DIAGNOSIS — M53.3 PAIN OF RIGHT SACROILIAC JOINT: Primary | ICD-10-CM

## 2023-05-25 DIAGNOSIS — R10.31 RLQ ABDOMINAL PAIN: ICD-10-CM

## 2023-05-25 DIAGNOSIS — K59.00 CONSTIPATION, UNSPECIFIED CONSTIPATION TYPE: ICD-10-CM

## 2023-05-25 DIAGNOSIS — R10.2 PELVIC PAIN IN FEMALE: ICD-10-CM

## 2023-05-25 DIAGNOSIS — G89.29 CHRONIC BILATERAL LOW BACK PAIN WITHOUT SCIATICA: ICD-10-CM

## 2023-05-25 DIAGNOSIS — M54.50 CHRONIC BILATERAL LOW BACK PAIN WITHOUT SCIATICA: ICD-10-CM

## 2023-05-25 PROCEDURE — 97140 MANUAL THERAPY 1/> REGIONS: CPT | Mod: GP | Performed by: PHYSICAL THERAPIST

## 2023-05-25 PROCEDURE — 97110 THERAPEUTIC EXERCISES: CPT | Mod: GP | Performed by: PHYSICAL THERAPIST

## 2023-05-25 NOTE — PROGRESS NOTES
05/25/23 0500   Appointment Info   Signing clinician's name / credentials Susan Nadir, PT   Visits Used 3/12   Medical Diagnosis RLQ abdominal pain, pelvic pain in female   PT Tx Diagnosis SI joint dysfunction, low back pain, (R) LQ pain   Other pertinent information ref provider: Christiana Beach MD   Progress Note/Certification   Onset of illness/injury or Date of Surgery   (12/2021, ref date 3/31/23))   Progress Note Due Date 07/20/23   PT Goal 1   Goal Identifier HEP   Goal Description Patient will be independent with home exercise program and self management of symptoms in 12 weeks.   Rationale to maximize safety and independence with performance of ADLs and functional tasks   Goal Progress progressing.   PT Goal 2   Goal Identifier abd pain, constipation   Goal Description Patient will report 50% improvement in abdominal pain and constipation symptoms in 12 weeks.   Rationale to maximize safety and independence with performance of ADLs and functional tasks   Goal Progress progessing. Having less abdominal pain.   PT Goal 3   Goal Identifier sitting   Goal Description Patient will be able to sit for 2 hours for work or watching a movie without increased symptoms in 12 weeks.   Rationale to maximize safety and independence with performance of ADLs and functional tasks   Goal Progress progressing.   Subjective Report   Subjective Report Returns after about 1 month. Some pain in (R) SI joint the last couple days. Overall, having less (R) LQ pain. Constipation has improved. Sitting tolerance slightly improved.   Objective Measure 1   Details (R) PSIS post in standing. (R) FB test (+).   Objective Measure 2   Details Mild tenderness of (B) ant pubis, mod tenderness of (B) sup pubis.   Objective Measure 3   Details able to demonstrate diaphragmatic breathing with minimal cueing.   Objective Measure 4   Details abd/TA recruitment is fair/good.   Therapeutic Procedure/Exercise   Ther Proc 1 pretzel stretch   Ther Proc 1  "- Details review modified and full stretch to tolerance. should feel a stretch w/out increased symptoms afterward   Ther Proc 2 * abd sets   Ther Proc 2 - Details 10 x 5\"   Ther Proc 3 *diaphragmatic breathing   Ther Proc 3 - Details supine 10 x/set/up to 5-10 min,   Skilled Intervention ther ex (ROM, flexibility, pain) (*=new)   Patient Response/Progress good., tolerated new exercises well.   Therapeutic Procedures: strength, endurance, ROM, flexibillity minutes (07479) 23   Manual Therapy   Manual Therapy: Mobilization, MFR, MLD, friction massage minutes (90350) 35   Skilled Intervention MFR, counterstrain   Patient Response/Progress good.   Treatment Detail (R) hemipelvis, (R) iliacus, supine LS traction, iliac gap, (B) MUL-V, asc colon,   Education   Learner/Method Patient   Education Comments demonstrated understanding   Plan   Home program Pt has a previous HEP. reviewed Bridging, modified pretzel stretch. Diaphragmatic breathing, abd sets   Plan for next session Continue  with POC, including manual therapy, progression of home program, patient education.   Comments   Comments initial assessment: Patient presents with (R)/(B) low back and post pelvic pain, (R) SI pain, (R) lower quadrant pain, constipation. Onset 12/21 for low back, several months for abdomen. Functional limitations include sitting, constipation, bending, lifting.. Findings include pain with trunk ROM, tenderness of (R) SI joint and sacrum, decreased hip flexibility, fascial restrictions of abdominal viscera..   Total Session Time   Timed Code Treatment Minutes 58   Total Treatment Time (sum of timed and untimed services) 58   Medicare Claim Information   Medical Diagnosis RLQ abdominal pain, pelvic pain in female   PT Diagnosis SI joint dysfunction, low back pain, (R) LQ pain   Start of Care Date 04/21/23   Onset date of current episode/exacerbation   (12/2021, ref date 3/31/23)   PT Outcome Measures   Outcome Measures   (OSI 24)       "

## 2023-05-31 NOTE — PROGRESS NOTES
Mental Health Visit Note    4/24/17    Start time: 4:10 PM    Stop Time: 5:09 PM   Session # 6    Laney Flood is a 36 y.o. female is being seen today for    Chief Complaint   Patient presents with     Follow-up     Anxiety   .     New symptoms or complaints: None    Functional Impairment:   Personal: 3  Family: 3  Work: 3  Social:3    Clinical assessment of mental status:Laney presented on time for her appointment. She was oriented x3, open and cooperative, and dressed appropriately for this session and weather. Her memory was Normal cognitive functioning . Her speech was within normal. Language was appropriate to discussion. Concentration and focus is Within normal. Psychosis is not noted nor reported. Her mood is euthymic. Congruent w/content of speech. Affect is anxious. Fund of knowledge is adequate. Insight is adequate for therapy.     Suicidal/Homicidal Ideation present: None Reported This Session    Patient's impression of their current status: Patient discussed her 's health issues in the context of having recently passed his 1-year anniversary of learning of his kidney problems.  While he has made important strides in his health, she spoke about the continued impact of his transplant and his convalescence on their lives.  She is looking forward to an upcoming trip with her 's parents while also acknowledging that there is some stress for her with working within their budget limitations in an atmosphere where she longs to reciprocate the generosity of his parents by picking up an occasional restaurant bill.  She acknowledged that her  does not appear at all troubled by this, and considered what will help her experience more comfortable authenticity.  She had a positive experience on her recent work trip and reflected on the techniques that helped her be more present.        Therapist impression of patients current state: Patient is engaged in the psychotherapeutic process.  "Processed thoughts, feelings, beliefs. She is willing to explore coping techniques, including thought defusion techniques and identifying 'unhelpful thoughts' and introducing alternative healthy behaviors in the face of them. She considers concepts of self that contribute to anxiety and is receptive to strategies for recognizing and changing self talk. She is very insightful and receptive to framework for learning to live in different relationship with anxiety while also willing to learn/apply CBT strategies.     Type of psychotherapeutic technique provided: Insight oriented, Client centered, Solution-focused and CBT    Progress toward short term goals:Progress as expected, patient is applying techniques discussed in session to good benefit.    Review of long term goals: Will update Treatment Plan at next follow up.  Patient is making progress on her long-term goals.    Diagnosis:   1. Generalized anxiety disorder        Plan and Follow up: Patient plans to continue to apply 3-2-1 strategy for reducing anxiety/panic symptoms, as needed, and to practice looking \"at thoughts rather than from thoughts\", applying defusion strategy to unhelpful thoughts. Patient plans to continue to use other ACT defusion techniques and some more breathing strategies (such as Square breathing, Yoga breathing). Plans to continue to nurture mindfulness exercise (15 minutes/day), to practice holding attention on sensing. Plans to return in 2-3 weeks.      Discharge Criteria/Planning: Patient will continue with follow-up until therapy can be discontinued without return of signs and symptoms.    Melita Ramirez 4/24/2017      This note was created with help of Dragon dictation software. Grammatical / typing errors are not intentional and inherent to the software.  " [facility]:  I discussed with the patient the risks of ustekinumab including but not limited to immunosuppression, malignancy, posterior leukoencephalopathy syndrome, and serious infections.  The patient understands that monitoring is required including a PPD at baseline and must alert us or the primary physician if symptoms of infection or other concerning signs are noted.

## 2023-06-01 ENCOUNTER — THERAPY VISIT (OUTPATIENT)
Dept: PHYSICAL THERAPY | Facility: REHABILITATION | Age: 43
End: 2023-06-01
Attending: FAMILY MEDICINE
Payer: COMMERCIAL

## 2023-06-01 DIAGNOSIS — K59.00 CONSTIPATION, UNSPECIFIED CONSTIPATION TYPE: ICD-10-CM

## 2023-06-01 DIAGNOSIS — M54.50 CHRONIC BILATERAL LOW BACK PAIN WITHOUT SCIATICA: ICD-10-CM

## 2023-06-01 DIAGNOSIS — G89.29 CHRONIC BILATERAL LOW BACK PAIN WITHOUT SCIATICA: ICD-10-CM

## 2023-06-01 DIAGNOSIS — R10.2 PELVIC PAIN IN FEMALE: ICD-10-CM

## 2023-06-01 DIAGNOSIS — M53.3 PAIN OF RIGHT SACROILIAC JOINT: ICD-10-CM

## 2023-06-01 DIAGNOSIS — R10.31 RLQ ABDOMINAL PAIN: Primary | ICD-10-CM

## 2023-06-01 PROCEDURE — 97110 THERAPEUTIC EXERCISES: CPT | Mod: GP | Performed by: PHYSICAL THERAPIST

## 2023-06-01 PROCEDURE — 97140 MANUAL THERAPY 1/> REGIONS: CPT | Mod: GP | Performed by: PHYSICAL THERAPIST

## 2023-06-01 NOTE — PROGRESS NOTES
06/01/23 0500   Appointment Info   Signing clinician's name / credentials Susan Schmitz, PT   Visits Used 4/12   Medical Diagnosis RLQ abdominal pain, pelvic pain in female   PT Tx Diagnosis SI joint dysfunction, low back pain, (R) LQ pain   Other pertinent information ref provider: Christiana Beach MD   Progress Note/Certification   Onset of illness/injury or Date of Surgery   (12/2021, ref date 3/31/23))   Progress Note Due Date 07/20/23   PT Goal 1   Goal Identifier HEP   Goal Description Patient will be independent with home exercise program and self management of symptoms in 12 weeks.   Rationale to maximize safety and independence with performance of ADLs and functional tasks   Goal Progress progressing.   PT Goal 2   Goal Identifier abd pain, constipation   Goal Description Patient will report 50% improvement in abdominal pain and constipation symptoms in 12 weeks.   Rationale to maximize safety and independence with performance of ADLs and functional tasks   Goal Progress progessing. Having less abdominal pain.   PT Goal 3   Goal Identifier sitting   Goal Description Patient will be able to sit for 2 hours for work or watching a movie without increased symptoms in 12 weeks.   Rationale to maximize safety and independence with performance of ADLs and functional tasks   Goal Progress progressing.   Subjective Report   Subjective Report Has been doing okay. 1 episode of (R) LQ pain in the past week. Definitely less intense. Continued (R) SI jt pain, worse with sitting, after hiking. No constipation symptoms in the past week.   Objective Measure 1   Details (R) PSIS post in standing. (R) FB test (+). trunk flex WNL/pain free.   Objective Measure 2   Details cranial scan (+) (R) pelvic periosteals. Tenderness of midline lower sacrum. sacral mobility restrictions into ext noted. Fascial restrictions of (R) SI joint.   Objective Measure 3   Details Mild chest rise noted with diaphragmatic breathing, better with verbal  "cues.   Therapeutic Procedure/Exercise   Therapeutic Procedures: strength, endurance, ROM, flexibillity minutes (68791) 25   Ther Proc 2 abd sets   Ther Proc 2 - Details 10 x 5\"   Ther Proc 3 diaphragmatic breathing   Ther Proc 3 - Details supine 10 x/set/up to 5-10 min,   Skilled Intervention ther ex (ROM, flexibility, pain) (*=new)   Patient Response/Progress good, needs some verbal cues to limit chest rise w/breathing ex.   Manual Therapy   Manual Therapy: Mobilization, MFR, MLD, friction massage minutes (27593) 30   Skilled Intervention MFR, counterstrain   Patient Response/Progress good. sacral mobility improved post treatment.   Treatment Detail sacral release, SACF(P), (L) FST(P), (R) LPL5, (R) UPL5, supine LS traction, ilac gap   Education   Learner/Method Patient   Education Comments demonstrated understanding   Plan   Home program Pt has a previous HEP. reviewed Bridging, modified pretzel stretch. Diaphragmatic breathing, abd sets   Plan for next session Continue  with POC, including manual therapy, progression of home program, patient education.   Comments   Comments initial assessment: Patient presents with (R)/(B) low back and post pelvic pain, (R) SI pain, (R) lower quadrant pain, constipation. Onset 12/21 for low back, several months for abdomen. Functional limitations include sitting, constipation, bending, lifting.. Findings include pain with trunk ROM, tenderness of (R) SI joint and sacrum, decreased hip flexibility, fascial restrictions of abdominal viscera..   Total Session Time   Timed Code Treatment Minutes 55   Total Treatment Time (sum of timed and untimed services) 55   Medicare Claim Information   Medical Diagnosis RLQ abdominal pain, pelvic pain in female   PT Diagnosis SI joint dysfunction, low back pain, (R) LQ pain   Start of Care Date 04/21/23   Onset date of current episode/exacerbation   (12/2021, ref date 3/31/23)   PT Outcome Measures   Outcome Measures   (OSI 24)       "

## 2023-06-19 ENCOUNTER — ALLIED HEALTH/NURSE VISIT (OUTPATIENT)
Dept: FAMILY MEDICINE | Facility: CLINIC | Age: 43
End: 2023-06-19
Payer: COMMERCIAL

## 2023-06-19 DIAGNOSIS — Z23 IMMUNIZATION DUE: ICD-10-CM

## 2023-06-19 PROCEDURE — 90746 HEPB VACCINE 3 DOSE ADULT IM: CPT

## 2023-06-19 PROCEDURE — 99207 PR NO CHARGE NURSE ONLY: CPT

## 2023-06-19 PROCEDURE — 90471 IMMUNIZATION ADMIN: CPT

## 2023-06-26 ENCOUNTER — THERAPY VISIT (OUTPATIENT)
Dept: PHYSICAL THERAPY | Facility: REHABILITATION | Age: 43
End: 2023-06-26
Payer: COMMERCIAL

## 2023-06-26 DIAGNOSIS — R10.2 PELVIC PAIN IN FEMALE: ICD-10-CM

## 2023-06-26 DIAGNOSIS — K59.00 CONSTIPATION, UNSPECIFIED CONSTIPATION TYPE: ICD-10-CM

## 2023-06-26 DIAGNOSIS — M53.3 PAIN OF RIGHT SACROILIAC JOINT: ICD-10-CM

## 2023-06-26 DIAGNOSIS — G89.29 CHRONIC BILATERAL LOW BACK PAIN WITHOUT SCIATICA: ICD-10-CM

## 2023-06-26 DIAGNOSIS — M54.50 CHRONIC BILATERAL LOW BACK PAIN WITHOUT SCIATICA: ICD-10-CM

## 2023-06-26 DIAGNOSIS — R10.31 RLQ ABDOMINAL PAIN: Primary | ICD-10-CM

## 2023-06-26 PROCEDURE — 97140 MANUAL THERAPY 1/> REGIONS: CPT | Mod: GP | Performed by: PHYSICAL THERAPIST

## 2023-06-26 NOTE — PROGRESS NOTES
06/26/23 0500   Appointment Info   Signing clinician's name / credentials Susan Nadir, PT   Visits Used 5/12   Medical Diagnosis RLQ abdominal pain, pelvic pain in female   PT Tx Diagnosis SI joint dysfunction, low back pain, (R) LQ pain   Other pertinent information ref provider: Christiana Beach MD   Progress Note/Certification   Onset of illness/injury or Date of Surgery 03/31/23  (12/2021, ref date 3/31/23))   Progress Note Due Date 07/20/23   PT Goal 1   Goal Identifier HEP   Goal Description Patient will be independent with home exercise program and self management of symptoms in 12 weeks.   Rationale to maximize safety and independence with performance of ADLs and functional tasks   Goal Progress progressing. Patient is doing her home program.   PT Goal 2   Goal Identifier abd pain, constipation   Goal Description Patient will report 50% improvement in abdominal pain and constipation symptoms in 12 weeks.   Rationale to maximize safety and independence with performance of ADLs and functional tasks   Goal Progress progessing. Abdominal pain is mild, infrequent.   PT Goal 3   Goal Identifier sitting   Goal Description Patient will be able to sit for 2 hours for work or watching a movie without increased symptoms in 12 weeks.   Rationale to maximize safety and independence with performance of ADLs and functional tasks   Goal Progress progressing. Tolerance is about 1 hour. Trying not to sit too much.   Subjective Report   Subjective Report Returns after about 3 weeks. Still having some (R) SI pain, but improved. Walked a lot while camping. Sitting tolerance is about an hour.Trying to do 10k/ steps/day. Mild constipation. (R) LQ pain has improved; has had a couple mild episodes since the last session.   Objective Measure 1   Details (R) PSIS post in standing. (R) FB test (+). No pain with forward bending.   Objective Measure 2   Details Tenderness of R>L SI joint, coccyx   Objective Measure 3   Details fascial  tightness of (R) ITB.   Therapeutic Procedure/Exercise   Skilled Intervention ther ex (ROM, flexibility, pain) (*=new)   Manual Therapy   Manual Therapy: Mobilization, MFR, MLD, friction massage minutes (67697) 55   Skilled Intervention MFR, counterstrain   Patient Response/Progress good. sacral mobility improved post treatment.   Treatment Detail (R) ITB, pelvic diaphragm, supine LS traction, iliac gap, cecum, sacral release, (R) LPL5, (L) HFO-SI, MFR to coccyx   Education   Learner/Method Patient   Education Comments demonstrated understanding   Plan   Home program Pt has a previous HEP. reviewed Bridging, modified pretzel stretch. Diaphragmatic breathing, abd sets   Plan for next session Continue  with POC, including manual therapy, progression of home program, patient education.   Comments   Comments initial assessment: Patient presents with (R)/(B) low back and post pelvic pain, (R) SI pain, (R) lower quadrant pain, constipation. Onset 12/21 for low back, several months for abdomen. Functional limitations include sitting, constipation, bending, lifting.. Findings include pain with trunk ROM, tenderness of (R) SI joint and sacrum, decreased hip flexibility, fascial restrictions of abdominal viscera..   Total Session Time   Timed Code Treatment Minutes 55   Total Treatment Time (sum of timed and untimed services) 55   Medicare Claim Information   Medical Diagnosis RLQ abdominal pain, pelvic pain in female   PT Diagnosis SI joint dysfunction, low back pain, (R) LQ pain   Start of Care Date 04/21/23   Onset date of current episode/exacerbation   (12/2021, ref date 3/31/23)   PT Outcome Measures   Outcome Measures   (MER 24)

## 2023-07-15 ENCOUNTER — MYC MEDICAL ADVICE (OUTPATIENT)
Dept: ONCOLOGY | Facility: CLINIC | Age: 43
End: 2023-07-15

## 2023-08-10 ENCOUNTER — THERAPY VISIT (OUTPATIENT)
Dept: PHYSICAL THERAPY | Facility: REHABILITATION | Age: 43
End: 2023-08-10
Payer: COMMERCIAL

## 2023-08-10 DIAGNOSIS — R10.31 RLQ ABDOMINAL PAIN: Primary | ICD-10-CM

## 2023-08-10 DIAGNOSIS — R10.2 PELVIC PAIN IN FEMALE: ICD-10-CM

## 2023-08-10 DIAGNOSIS — M53.3 PAIN OF RIGHT SACROILIAC JOINT: ICD-10-CM

## 2023-08-10 PROCEDURE — 97140 MANUAL THERAPY 1/> REGIONS: CPT | Mod: GP | Performed by: PHYSICAL THERAPIST

## 2023-08-10 NOTE — PROGRESS NOTES
08/10/23 0500   Appointment Info   Signing clinician's name / credentials Susan Nadir, PT   Visits Used 6/12   Medical Diagnosis RLQ abdominal pain, pelvic pain in female   PT Tx Diagnosis SI joint dysfunction, low back pain, (R) LQ pain   Other pertinent information ref provider: Christiana Beach MD   Progress Note/Certification   Onset of illness/injury or Date of Surgery 03/31/23  (12/2021, ref date 3/31/23))   Progress Note Due Date 07/20/23   PT Goal 1   Goal Identifier HEP   Goal Description Patient will be independent with home exercise program and self management of symptoms in 12 weeks.   Rationale to maximize safety and independence with performance of ADLs and functional tasks   Goal Progress progressing. Patient is doing her home program.   PT Goal 2   Goal Identifier abd pain, constipation   Goal Description Patient will report 50% improvement in abdominal pain and constipation symptoms in 12 weeks.   Rationale to maximize safety and independence with performance of ADLs and functional tasks   Goal Progress progessing. Abdominal pain is mild, infrequent.   PT Goal 3   Goal Identifier sitting   Goal Description Patient will be able to sit for 2 hours for work or watching a movie without increased symptoms in 12 weeks.   Rationale to maximize safety and independence with performance of ADLs and functional tasks   Goal Progress progressing. Tolerance is about 1 hour. Trying not to sit too much.   Subjective Report   Subjective Report Returns after about 6 weeks. Feels like she's figuring how/where to stretch in the (R) hip. Has been doing yoga also. Had one episode of (R) LQ pain in late June, and some mild pain associated with constipation over the weekend. Notes prolonged sitting is problematic. Also saw chiropractor last weekend, was sore the next day. Constipation has improved since SOC.   Objective Measure 1   Details (R) ANDREW mild tightness w/post pain, FADIR mild tightness   Objective Measure 2    Details (R) hip PROM: ER 33.   Objective Measure 3   Details FB test (-). PSIS level in standing.   Objective Measure 4   Details Tightness of (R) ant hip/pelvis noted with leg pull.   Therapeutic Procedure/Exercise   Skilled Intervention ther ex (ROM, flexibility, pain) (*=new)   Manual Therapy   Manual Therapy: Mobilization, MFR, MLD, friction massage minutes (23163) 55   Skilled Intervention MFR, counterstrain   Patient Response/Progress good. sacral mobility improved post treatment.   Treatment Detail pelvic diaphragm, (R) psoas release, (R) piriformis release, (R) upper quad release, cecum, asc colon, hepatic flexure,   Education   Learner/Method Patient   Education Comments demonstrated understanding   Plan   Home program Pt has a previous HEP. reviewed Bridging, modified pretzel stretch. Diaphragmatic breathing, abd sets   Plan for next session Continue  with POC, including manual therapy, progression of home program, patient education.   Comments   Comments initial assessment: Patient presents with (R)/(B) low back and post pelvic pain, (R) SI pain, (R) lower quadrant pain, constipation. Onset 12/21 for low back, several months for abdomen. Functional limitations include sitting, constipation, bending, lifting.. Findings include pain with trunk ROM, tenderness of (R) SI joint and sacrum, decreased hip flexibility, fascial restrictions of abdominal viscera..   Total Session Time   Timed Code Treatment Minutes 55   Total Treatment Time (sum of timed and untimed services) 55   Medicare Claim Information   Medical Diagnosis RLQ abdominal pain, pelvic pain in female   PT Diagnosis SI joint dysfunction, low back pain, (R) LQ pain   Start of Care Date 04/21/23   Onset date of current episode/exacerbation   (12/2021, ref date 3/31/23)   PT Outcome Measures   Outcome Measures   (MER 24)

## 2023-08-15 ENCOUNTER — THERAPY VISIT (OUTPATIENT)
Dept: PHYSICAL THERAPY | Facility: REHABILITATION | Age: 43
End: 2023-08-15
Payer: COMMERCIAL

## 2023-08-15 DIAGNOSIS — R10.2 PELVIC PAIN IN FEMALE: ICD-10-CM

## 2023-08-15 DIAGNOSIS — R10.31 RLQ ABDOMINAL PAIN: Primary | ICD-10-CM

## 2023-08-15 DIAGNOSIS — M53.3 PAIN OF RIGHT SACROILIAC JOINT: ICD-10-CM

## 2023-08-15 PROCEDURE — 97140 MANUAL THERAPY 1/> REGIONS: CPT | Mod: GP | Performed by: PHYSICAL THERAPIST

## 2023-08-15 NOTE — PROGRESS NOTES
08/15/23 0500   Appointment Info   Signing clinician's name / credentials Susan Schmitz, PT   Visits Used 7/12   Medical Diagnosis RLQ abdominal pain, pelvic pain in female   PT Tx Diagnosis SI joint dysfunction, low back pain, (R) LQ pain   Other pertinent information ref provider: Christiana Beach MD   Progress Note/Certification   Onset of illness/injury or Date of Surgery 03/31/23  (12/2021, ref date 3/31/23))   Progress Note Due Date 07/20/23   PT Goal 1   Goal Identifier HEP   Goal Description Patient will be independent with home exercise program and self management of symptoms in 12 weeks.   Rationale to maximize safety and independence with performance of ADLs and functional tasks   Goal Progress progressing. Patient is doing her home program.   PT Goal 2   Goal Identifier abd pain, constipation   Goal Description Patient will report 50% improvement in abdominal pain and constipation symptoms in 12 weeks.   Rationale to maximize safety and independence with performance of ADLs and functional tasks   Goal Progress progessing. Abdominal pain is mild, infrequent.   PT Goal 3   Goal Identifier sitting   Goal Description Patient will be able to sit for 2 hours for work or watching a movie without increased symptoms in 12 weeks.   Rationale to maximize safety and independence with performance of ADLs and functional tasks   Goal Progress progressing. Tolerance is about 1 hour. Trying not to sit too much.   Subjective Report   Subjective Report Doing about the same as last session. Minimal/no (R) lower abdominal pain. Went to yoga class over the weekend.  Some discomfort with boat pose. Some constipation yesterday. However, abd pain not always associated with constipation. Tolerated sitting through a movie and a training at work, which is an improvement.   Objective Measure 1   Details Mod fascial restrictions R>L hip flexors, pelvic fascia.   Objective Measure 2   Details Mod fascial restrictions of (R) kidney and  respiratory diaphragm.   Objective Measure 3   Details Mid/upper abdominal restrictions noted with (R) leg pull.   Therapeutic Procedure/Exercise   Skilled Intervention ther ex (ROM, flexibility, pain) (*=new)   Manual Therapy   Manual Therapy: Mobilization, MFR, MLD, friction massage minutes ([id number]) 55   Skilled Intervention MFR, counterstrain   Patient Response/Progress good. tolerated well.  Good resolution of (R) mid/upper abdominal restrictions with manual techniques   Treatment Detail pelvic diaphragm, (R) psoas release, cecum, (L) iliacus and hemipelvis release, (R) kidney, respiratory diaphragm   Education   Learner/Method Patient   Education Comments demonstrated understanding   Plan   Home program Pt has a previous HEP. reviewed Bridging, modified pretzel stretch. Diaphragmatic breathing, abd sets   Plan for next session Next appt is in 2 months. Pt is on wait list. Continue  with POC, including manual therapy, progression of home program, patient education.   Comments   Comments initial assessment: Patient presents with (R)/(B) low back and post pelvic pain, (R) SI pain, (R) lower quadrant pain, constipation. Onset 12/21 for low back, several months for abdomen. Functional limitations include sitting, constipation, bending, lifting.. Findings include pain with trunk ROM, tenderness of (R) SI joint and sacrum, decreased hip flexibility, fascial restrictions of abdominal viscera..   Total Session Time   Timed Code Treatment Minutes 55   Total Treatment Time (sum of timed and untimed services) 55   Medicare Claim Information   Medical Diagnosis RLQ abdominal pain, pelvic pain in female   PT Diagnosis SI joint dysfunction, low back pain, (R) LQ pain   Start of Care Date 04/21/23   Onset date of current episode/exacerbation   (12/2021, ref date 3/31/23)   PT Outcome Measures   Outcome Measures   (MER 24)

## 2023-08-22 ENCOUNTER — THERAPY VISIT (OUTPATIENT)
Dept: PHYSICAL THERAPY | Facility: REHABILITATION | Age: 43
End: 2023-08-22
Payer: COMMERCIAL

## 2023-08-22 DIAGNOSIS — R10.31 RLQ ABDOMINAL PAIN: Primary | ICD-10-CM

## 2023-08-22 DIAGNOSIS — M53.3 PAIN OF RIGHT SACROILIAC JOINT: ICD-10-CM

## 2023-08-22 DIAGNOSIS — R10.2 PELVIC PAIN IN FEMALE: ICD-10-CM

## 2023-08-22 PROCEDURE — 97140 MANUAL THERAPY 1/> REGIONS: CPT | Mod: GP | Performed by: PHYSICAL THERAPIST

## 2023-08-22 NOTE — PROGRESS NOTES
"   08/22/23 0500   Appointment Info   Signing clinician's name / credentials Susan Schmitz, PT   Visits Used 8/12   Medical Diagnosis RLQ abdominal pain, pelvic pain in female   PT Tx Diagnosis SI joint dysfunction, low back pain, (R) LQ pain   Other pertinent information ref provider: Christiana Beach MD   Progress Note/Certification   Onset of illness/injury or Date of Surgery 03/31/23  (12/2021, ref date 3/31/23))   Progress Note Due Date 07/20/23   PT Goal 1   Goal Identifier HEP   Goal Description Patient will be independent with home exercise program and self management of symptoms in 12 weeks.   Rationale to maximize safety and independence with performance of ADLs and functional tasks   Goal Progress progressing. Patient is doing her home program.   PT Goal 2   Goal Identifier abd pain, constipation   Goal Description Patient will report 50% improvement in abdominal pain and constipation symptoms in 12 weeks.   Rationale to maximize safety and independence with performance of ADLs and functional tasks   Goal Progress progessing. Abdominal pain is mild, infrequent.   PT Goal 3   Goal Identifier sitting   Goal Description Patient will be able to sit for 2 hours for work or watching a movie without increased symptoms in 12 weeks.   Rationale to maximize safety and independence with performance of ADLs and functional tasks   Goal Progress progressing. Tolerance is about 1 hour. Trying not to sit too much.   Subjective Report   Subjective Report \"good\". 2 episodes of (R) LQ pain in the past week, duration 5 min. 1x/while driving, 1x in the evening at home. Previously, symptoms lasted for hours and episode were more frequent.   Objective Measure 1   Details Mod fascial restrictions of (R) hip flexors, cecum.   Objective Measure 2   Details Mod fascial restrictions of (R) SI joint, sacrum, (R) post hip musculature.   Therapeutic Procedure/Exercise   Skilled Intervention ther ex (ROM, flexibility, pain) (*=new)   Manual " Therapy   Manual Therapy: Mobilization, MFR, MLD, friction massage minutes (17080) 53   Skilled Intervention MFR, counterstrain   Patient Response/Progress good. tolerated well.  Good resolution of (R) mid/upper abdominal restrictions with manual techniques   Treatment Detail respiratory diaphragm, (R) psoas, cecum, (R) pelvic fascia, asc colon, supine LS traction, iliac gap, (R) piriformis release,   Education   Learner/Method Patient   Education Comments demonstrated understanding   Plan   Home program Pt has a previous HEP. reviewed Bridging, modified pretzel stretch. Diaphragmatic breathing, abd sets   Plan for next session Next appt is in 2 months. Pt is on wait list. Continue  with POC, including manual therapy, progression of home program, patient education.   Comments   Comments initial assessment: Patient presents with (R)/(B) low back and post pelvic pain, (R) SI pain, (R) lower quadrant pain, constipation. Onset 12/21 for low back, several months for abdomen. Functional limitations include sitting, constipation, bending, lifting.. Findings include pain with trunk ROM, tenderness of (R) SI joint and sacrum, decreased hip flexibility, fascial restrictions of abdominal viscera..   Total Session Time   Timed Code Treatment Minutes 53   Total Treatment Time (sum of timed and untimed services) 53   Medicare Claim Information   Medical Diagnosis RLQ abdominal pain, pelvic pain in female   PT Diagnosis SI joint dysfunction, low back pain, (R) LQ pain   Start of Care Date 04/21/23   Onset date of current episode/exacerbation   (12/2021, ref date 3/31/23)   PT Outcome Measures   Outcome Measures   (MER 24)

## 2023-08-31 ENCOUNTER — TRANSFERRED RECORDS (OUTPATIENT)
Dept: HEALTH INFORMATION MANAGEMENT | Facility: CLINIC | Age: 43
End: 2023-08-31
Payer: COMMERCIAL

## 2023-09-18 ENCOUNTER — THERAPY VISIT (OUTPATIENT)
Dept: PHYSICAL THERAPY | Facility: REHABILITATION | Age: 43
End: 2023-09-18
Payer: COMMERCIAL

## 2023-09-18 DIAGNOSIS — R10.31 RLQ ABDOMINAL PAIN: Primary | ICD-10-CM

## 2023-09-18 DIAGNOSIS — M53.3 PAIN OF RIGHT SACROILIAC JOINT: ICD-10-CM

## 2023-09-18 DIAGNOSIS — R10.2 PELVIC PAIN IN FEMALE: ICD-10-CM

## 2023-09-18 PROCEDURE — 97140 MANUAL THERAPY 1/> REGIONS: CPT | Mod: GP | Performed by: PHYSICAL THERAPIST

## 2023-09-18 NOTE — PROGRESS NOTES
"   09/18/23 0500   Appointment Info   Signing clinician's name / credentials Susan Schmitz, PT   Visits Used 9/12   Medical Diagnosis RLQ abdominal pain, pelvic pain in female   PT Tx Diagnosis SI joint dysfunction, low back pain, (R) LQ pain   Other pertinent information ref provider: Christiana Beach MD   Progress Note/Certification   Onset of illness/injury or Date of Surgery 03/31/23  (12/2021, ref date 3/31/23))   Progress Note Due Date 07/20/23   PT Goal 1   Goal Identifier HEP   Goal Description Patient will be independent with home exercise program and self management of symptoms in 12 weeks.   Rationale to maximize safety and independence with performance of ADLs and functional tasks   Goal Progress progressing. Patient is doing her home program.   PT Goal 2   Goal Identifier abd pain, constipation   Goal Description Patient will report 50% improvement in abdominal pain and constipation symptoms in 12 weeks.   Rationale to maximize safety and independence with performance of ADLs and functional tasks   Goal Progress Mostly met. Pain is infrequent and of short duration.   PT Goal 3   Goal Identifier sitting   Goal Description Patient will be able to sit for 2 hours for work or watching a movie without increased symptoms in 12 weeks.   Rationale to maximize safety and independence with performance of ADLs and functional tasks   Goal Progress progressing. Tolerance is about 1 hour. Trying not to sit too much.   Subjective Report   Subjective Report Returns after about 4 weeks. Pain is much better. Feels like she's doing \"pretty good\", hasn't been having much pain at all. A little discomfort at times, resolves quickly. Still doing outdoor yoga for now, and doing classical stretch videos at home.   Objective Measure 1   Details Mild to mod fascial restrictions with associated mild tenderness of (R) LQ, (R) pelvic fascia.   Objective Measure 2   Details mild restrictions of (R) psoas, (L) pelvic fascia.   Therapeutic " Procedure/Exercise   Therapeutic Procedures: strength, endurance, ROM, flexibillity minutes (39119) 0   Skilled Intervention ther ex (ROM, flexibility, pain) (*=new)   Manual Therapy   Manual Therapy: Mobilization, MFR, MLD, friction massage minutes ([id number]) 55   Skilled Intervention MFR, counterstrain   Patient Response/Progress good. tolerated well.   Treatment Detail (R) psoas, (B) pelvic fascia/broad lig, pelvic diaphragm, respiratory diaphragm,   Education   Learner/Method Patient   Education Comments demonstrated understanding   Plan   Home program Pt has a previous HEP. reviewed Bridging, modified pretzel stretch. Diaphragmatic breathing, abd sets   Plan for next session Will cancel next appointment due to decreased symptoms. Follow up in 1 month. Continue  with POC, including manual therapy, progression of home program, patient education.   Comments   Comments initial assessment: Patient presents with (R)/(B) low back and post pelvic pain, (R) SI pain, (R) lower quadrant pain, constipation. Onset 12/21 for low back, several months for abdomen. Functional limitations include sitting, constipation, bending, lifting.. Findings include pain with trunk ROM, tenderness of (R) SI joint and sacrum, decreased hip flexibility, fascial restrictions of abdominal viscera..   Medicare Claim Information   Medical Diagnosis RLQ abdominal pain, pelvic pain in female   PT Diagnosis SI joint dysfunction, low back pain, (R) LQ pain   Start of Care Date 04/21/23   Onset date of current episode/exacerbation   (12/2021, ref date 3/31/23)   PT Outcome Measures   Outcome Measures   (MER 24)

## 2023-09-21 ENCOUNTER — TELEPHONE (OUTPATIENT)
Dept: FAMILY MEDICINE | Facility: CLINIC | Age: 43
End: 2023-09-21

## 2023-10-17 ENCOUNTER — THERAPY VISIT (OUTPATIENT)
Dept: PHYSICAL THERAPY | Facility: REHABILITATION | Age: 43
End: 2023-10-17
Payer: COMMERCIAL

## 2023-10-17 ENCOUNTER — MYC MEDICAL ADVICE (OUTPATIENT)
Dept: FAMILY MEDICINE | Facility: CLINIC | Age: 43
End: 2023-10-17

## 2023-10-17 DIAGNOSIS — M53.3 PAIN OF RIGHT SACROILIAC JOINT: ICD-10-CM

## 2023-10-17 DIAGNOSIS — R10.2 PELVIC PAIN IN FEMALE: ICD-10-CM

## 2023-10-17 DIAGNOSIS — R10.31 RLQ ABDOMINAL PAIN: Primary | ICD-10-CM

## 2023-10-17 PROCEDURE — 97140 MANUAL THERAPY 1/> REGIONS: CPT | Mod: GP | Performed by: PHYSICAL THERAPIST

## 2023-10-17 NOTE — PROGRESS NOTES
10/17/23 0500   Appointment Info   Signing clinician's name / credentials Susan Nadir, PT   Visits Used 10/12   Medical Diagnosis RLQ abdominal pain, pelvic pain in female   PT Tx Diagnosis SI joint dysfunction, low back pain, (R) LQ pain   Other pertinent information ref provider: Christiana Beach MD   Progress Note/Certification   Onset of illness/injury or Date of Surgery 03/31/23  (12/2021, ref date 3/31/23))   Progress Note Due Date 07/20/23   PT Goal 1   Goal Identifier HEP   Goal Description Patient will be independent with home exercise program and self management of symptoms in 12 weeks.   Rationale to maximize safety and independence with performance of ADLs and functional tasks   Goal Progress progressing. Patient is doing her home program.   PT Goal 2   Goal Identifier abd pain, constipation   Goal Description Patient will report 50% improvement in abdominal pain and constipation symptoms in 12 weeks.   Rationale to maximize safety and independence with performance of ADLs and functional tasks   Goal Progress Mostly met. Pain is infrequent and of short duration.   PT Goal 3   Goal Identifier sitting   Goal Description Patient will be able to sit for 2 hours for work or watching a movie without increased symptoms in 12 weeks.   Rationale to maximize safety and independence with performance of ADLs and functional tasks   Goal Progress progressing. Tolerance is about 1 hour. Trying not to sit too much.   Subjective Report   Subjective Report Returns after a month. Is having a little more lower abdominal and inguinal pain, R>L. Continues to be sporadic/intermittent, and of short duration. Has been walking daily, doing yoga 1x/wk. Is looking into virtual yoga classes.   Objective Measure 1   Details Mod fascial restrictions of (R) LQ, cecum, colon, flexures,   Objective Measure 2   Details Mod fascial restrictions of (R) hip flexors.   Therapeutic Procedure/Exercise   Skilled Intervention ther ex (ROM,  flexibility, pain) (*=new)   Manual Therapy   Manual Therapy: Mobilization, MFR, MLD, friction massage minutes (22992) 55   Skilled Intervention MFR, counterstrain   Patient Response/Progress good. tolerated well.   Treatment Detail cecum, (R) iliacus, asc colon, hepatic flexure, respiratory diaphragm, transv colon, pelvic diaphragm, (R) broad lig   Education   Learner/Method Patient   Education Comments demonstrated understanding   Plan   Home program Pt has a previous HEP. reviewed Bridging, modified pretzel stretch. Diaphragmatic breathing, abd sets   Plan for next session Continue  with POC, including manual therapy, progression of home program, patient education.   Comments   Comments initial assessment: Patient presents with (R)/(B) low back and post pelvic pain, (R) SI pain, (R) lower quadrant pain, constipation. Onset 12/21 for low back, several months for abdomen. Functional limitations include sitting, constipation, bending, lifting.. Findings include pain with trunk ROM, tenderness of (R) SI joint and sacrum, decreased hip flexibility, fascial restrictions of abdominal viscera..   Total Session Time   Timed Code Treatment Minutes 55   Total Treatment Time (sum of timed and untimed services) 55   Medicare Claim Information   Medical Diagnosis RLQ abdominal pain, pelvic pain in female   PT Diagnosis SI joint dysfunction, low back pain, (R) LQ pain   Start of Care Date 04/21/23   Onset date of current episode/exacerbation   (12/2021, ref date 3/31/23)

## 2023-10-20 NOTE — TELEPHONE ENCOUNTER
If pt is okay with the 2:45 pm appointment on 10/31/23, please add her to the RN schedule (not the MA) and add a note that Ivan LIZARRAGA Is to give the vaccine.

## 2023-10-31 ENCOUNTER — THERAPY VISIT (OUTPATIENT)
Dept: PHYSICAL THERAPY | Facility: REHABILITATION | Age: 43
End: 2023-10-31
Payer: COMMERCIAL

## 2023-10-31 ENCOUNTER — ALLIED HEALTH/NURSE VISIT (OUTPATIENT)
Dept: FAMILY MEDICINE | Facility: CLINIC | Age: 43
End: 2023-10-31
Payer: COMMERCIAL

## 2023-10-31 DIAGNOSIS — R10.31 RLQ ABDOMINAL PAIN: Primary | ICD-10-CM

## 2023-10-31 DIAGNOSIS — K59.00 CONSTIPATION, UNSPECIFIED CONSTIPATION TYPE: ICD-10-CM

## 2023-10-31 DIAGNOSIS — R10.2 PELVIC PAIN IN FEMALE: ICD-10-CM

## 2023-10-31 DIAGNOSIS — Z23 IMMUNIZATION DUE: Primary | ICD-10-CM

## 2023-10-31 DIAGNOSIS — Z23 ENCOUNTER FOR IMMUNIZATION: ICD-10-CM

## 2023-10-31 DIAGNOSIS — M53.3 PAIN OF RIGHT SACROILIAC JOINT: ICD-10-CM

## 2023-10-31 DIAGNOSIS — M54.50 CHRONIC BILATERAL LOW BACK PAIN WITHOUT SCIATICA: ICD-10-CM

## 2023-10-31 DIAGNOSIS — G89.29 CHRONIC BILATERAL LOW BACK PAIN WITHOUT SCIATICA: ICD-10-CM

## 2023-10-31 PROCEDURE — 99207 PR NO CHARGE NURSE ONLY: CPT

## 2023-10-31 PROCEDURE — 90746 HEPB VACCINE 3 DOSE ADULT IM: CPT

## 2023-10-31 PROCEDURE — 97140 MANUAL THERAPY 1/> REGIONS: CPT | Mod: GP | Performed by: PHYSICAL THERAPIST

## 2023-10-31 PROCEDURE — 90471 IMMUNIZATION ADMIN: CPT

## 2023-10-31 NOTE — PROGRESS NOTES
Prior to immunization administration, verified patients identity using patient s name and date of birth. Please see Immunization Activity for additional information.     Screening Questionnaire for Adult Immunization    Are you sick today?   No   Do you have allergies to medications, food, a vaccine component or latex?   No   Have you ever had a serious reaction after receiving a vaccination?   No   Do you have a long-term health problem with heart, lung, kidney, or metabolic disease (e.g., diabetes), asthma, a blood disorder, no spleen, complement component deficiency, a cochlear implant, or a spinal fluid leak?  Are you on long-term aspirin therapy?   No   Do you have cancer, leukemia, HIV/AIDS, or any other immune system problem?   No   Do you have a parent, brother, or sister with an immune system problem?   No   In the past 3 months, have you taken medications that affect  your immune system, such as prednisone, other steroids, or anticancer drugs; drugs for the treatment of rheumatoid arthritis, Crohn s disease, or psoriasis; or have you had radiation treatments?   No   Have you had a seizure, or a brain or other nervous system problem?   No   During the past year, have you received a transfusion of blood or blood    products, or been given immune (gamma) globulin or antiviral drug?   No   For women: Are you pregnant or is there a chance you could become       pregnant during the next month?   No   Have you received any vaccinations in the past 4 weeks?   No     Immunization questionnaire answers were all negative.    I have reviewed the following standing orders:   This patient is due and qualifies for the Hepatitis B vaccine.    Click here for Hepatitis B Standing Order    I have reviewed the vaccines inclusion and exclusion criteria; No concerns regarding eligibility.     Patient instructed to remain in clinic for 15 minutes afterwards, and to report any adverse reactions.     Screening performed by Ivan  NEMESIO Caceres RN on 10/31/2023 at 3:02 PM.

## 2023-10-31 NOTE — PROGRESS NOTES
10/31/23 0500   Appointment Info   Signing clinician's name / credentials Susan Schmitz, PT   Visits Used 11/12   Medical Diagnosis RLQ abdominal pain, pelvic pain in female   PT Tx Diagnosis SI joint dysfunction, low back pain, (R) LQ pain   Other pertinent information ref provider: Christiana Beach MD   Progress Note/Certification   Onset of illness/injury or Date of Surgery 03/31/23  (12/2021, ref date 3/31/23))   Progress Note Due Date 07/20/23   PT Goal 1   Goal Identifier HEP   Goal Description Patient will be independent with home exercise program and self management of symptoms in 12 weeks.   Rationale to maximize safety and independence with performance of ADLs and functional tasks   Goal Progress progressing. Patient is doing her home program.   PT Goal 2   Goal Identifier abd pain, constipation   Goal Description Patient will report 50% improvement in abdominal pain and constipation symptoms in 12 weeks.   Rationale to maximize safety and independence with performance of ADLs and functional tasks   Goal Progress Mostly met. Pain is infrequent and of short duration.   PT Goal 3   Goal Identifier sitting   Goal Description Patient will be able to sit for 2 hours for work or watching a movie without increased symptoms in 12 weeks.   Rationale to maximize safety and independence with performance of ADLs and functional tasks   Goal Progress progressing. Tolerance is about 1 hour. Trying not to sit too much.   Subjective Report   Subjective Report Returns after 2 weeks. Has been doing better. (R) lower quadrant pain is intermittent and sporadic, very short duration.Frequency about 2x/wk. Has been trying to do yoga 2x/wk, daily morning stretch class, walking.   Objective Measure 1   Details Mod fascial restrictions and associated tenderness of (R) LQ.   Objective Measure 2   Details Fascial restrictions of cecum, (R) broad lig, asc colon,   Therapeutic Procedure/Exercise   Skilled Intervention ther ex (ROM,  flexibility, pain) (*=new)   Manual Therapy   Manual Therapy: Mobilization, MFR, MLD, friction massage minutes ([id number]) 53   Skilled Intervention MFR, counterstrain   Patient Response/Progress good. tolerated well.   Treatment Detail in supine/(R) SL: (R) LQ, cecum, asc colon, (R) broad lig, rectum, (R) hip flexors,   Education   Learner/Method Patient   Education Comments demonstrated understanding   Plan   Home program Pt has a previous HEP. reviewed Bridging, modified pretzel stretch. Diaphragmatic breathing, abd sets   Plan for next session Continue  with POC x 1 visit, including manual therapy, progression of home program, patient education.   Comments   Comments initial assessment: Patient presents with (R)/(B) low back and post pelvic pain, (R) SI pain, (R) lower quadrant pain, constipation. Onset 12/21 for low back, several months for abdomen. Functional limitations include sitting, constipation, bending, lifting.. Findings include pain with trunk ROM, tenderness of (R) SI joint and sacrum, decreased hip flexibility, fascial restrictions of abdominal viscera..   Total Session Time   Timed Code Treatment Minutes 53   Total Treatment Time (sum of timed and untimed services) 53   Medicare Claim Information   Medical Diagnosis RLQ abdominal pain, pelvic pain in female   PT Diagnosis SI joint dysfunction, low back pain, (R) LQ pain   Start of Care Date 04/21/23   Onset date of current episode/exacerbation   (12/2021, ref date 3/31/23)

## 2023-11-14 ENCOUNTER — THERAPY VISIT (OUTPATIENT)
Dept: PHYSICAL THERAPY | Facility: REHABILITATION | Age: 43
End: 2023-11-14
Payer: COMMERCIAL

## 2023-11-14 DIAGNOSIS — R10.2 PELVIC PAIN IN FEMALE: ICD-10-CM

## 2023-11-14 DIAGNOSIS — R10.31 RLQ ABDOMINAL PAIN: Primary | ICD-10-CM

## 2023-11-14 DIAGNOSIS — M53.3 PAIN OF RIGHT SACROILIAC JOINT: ICD-10-CM

## 2023-11-14 PROCEDURE — 97140 MANUAL THERAPY 1/> REGIONS: CPT | Mod: GP | Performed by: PHYSICAL THERAPIST

## 2023-11-14 NOTE — PROGRESS NOTES
11/14/23 0500   Appointment Info   Signing clinician's name / credentials Susan Nadir, PT   Visits Used 12/12   Medical Diagnosis RLQ abdominal pain, pelvic pain in female   PT Tx Diagnosis SI joint dysfunction, low back pain, (R) LQ pain   Other pertinent information ref provider: Christiana Beach MD   Progress Note/Certification   Onset of illness/injury or Date of Surgery 03/31/23  (12/2021, ref date 3/31/23))   Progress Note Due Date 07/20/23   PT Goal 1   Goal Identifier HEP   Goal Description Patient will be independent with home exercise program and self management of symptoms in 12 weeks.   Rationale to maximize safety and independence with performance of ADLs and functional tasks   Goal Progress met.   PT Goal 2   Goal Identifier abd pain, constipation   Goal Description Patient will report 50% improvement in abdominal pain and constipation symptoms in 12 weeks.   Rationale to maximize safety and independence with performance of ADLs and functional tasks   Goal Progress Met. No pain in the past 2 weeks.   Date Met 11/14/23   PT Goal 3   Goal Identifier sitting   Goal Description Patient will be able to sit for 2 hours for work or watching a movie without increased symptoms in 12 weeks.   Rationale to maximize safety and independence with performance of ADLs and functional tasks   Goal Progress Met. No pain w/sitting.   Subjective Report   Subjective Report Feeling good. Hasn't had any side pain in the last 2 weeks. Felt a lot better after the last session. Has been doing yoga a couple times/wk, and stretch videos at home. She is able to do more exercises w/out pain compared to before SOC. Trying not to sit as much at work. No functional complaints. MER 2%   Objective Measure 1   Details Trunk ROM; flex WNL, ext  WFL, (R) SB WFL, (L) SB WFL   Objective Measure 2   Details Hip ROM: IR (L) 29 deg, (R) 30 deg   Objective Measure 3   Details Minimal to no tenderness of (R) LQ.   Therapeutic Procedure/Exercise    Skilled Intervention ther ex (ROM, flexibility, pain) (*=new)   Manual Therapy   Manual Therapy: Mobilization, MFR, MLD, friction massage minutes ([id number]) 53   Skilled Intervention MFR, counterstrain   Patient Response/Progress good. tolerated well.   Treatment Detail in supine/(R) SL: (R) LQ, desc colon, cecum, pelvic diaphragm, (B) piriformis release,   Education   Learner/Method Patient   Education Comments demonstrated understanding   Plan   Home program Pt has a previous HEP. reviewed Bridging, modified pretzel stretch. Diaphragmatic breathing, abd sets   Plan for next session No further visits scheduled. DC to home program.   Comments   Comments initial assessment: Patient presents with (R)/(B) low back and post pelvic pain, (R) SI pain, (R) lower quadrant pain, constipation. Onset 12/21 for low back, several months for abdomen. Functional limitations include sitting, constipation, bending, lifting.. Findings include pain with trunk ROM, tenderness of (R) SI joint and sacrum, decreased hip flexibility, fascial restrictions of abdominal viscera..   Total Session Time   Timed Code Treatment Minutes 53   Total Treatment Time (sum of timed and untimed services) 53   Medicare Claim Information   Medical Diagnosis RLQ abdominal pain, pelvic pain in female   PT Diagnosis SI joint dysfunction, low back pain, (R) LQ pain   Start of Care Date 04/21/23   Onset date of current episode/exacerbation   (12/2021, ref date 3/31/23)         DISCHARGE  Reason for Discharge: Patient has met all goals.  Symptoms have resolved. No functional complaints or limitations.     Equipment Issued:     Discharge Plan: Patient to continue home program.    Referring Provider:  Christiana Beach

## 2023-12-09 ENCOUNTER — HEALTH MAINTENANCE LETTER (OUTPATIENT)
Age: 43
End: 2023-12-09

## 2024-01-24 ENCOUNTER — TRANSFERRED RECORDS (OUTPATIENT)
Dept: HEALTH INFORMATION MANAGEMENT | Facility: CLINIC | Age: 44
End: 2024-01-24
Payer: COMMERCIAL

## 2024-02-04 ENCOUNTER — TRANSFERRED RECORDS (OUTPATIENT)
Dept: HEALTH INFORMATION MANAGEMENT | Facility: CLINIC | Age: 44
End: 2024-02-04
Payer: COMMERCIAL

## 2024-02-13 ENCOUNTER — OFFICE VISIT (OUTPATIENT)
Dept: FAMILY MEDICINE | Facility: CLINIC | Age: 44
End: 2024-02-13
Payer: COMMERCIAL

## 2024-02-13 VITALS
BODY MASS INDEX: 26.87 KG/M2 | TEMPERATURE: 97.9 F | HEIGHT: 66 IN | DIASTOLIC BLOOD PRESSURE: 67 MMHG | RESPIRATION RATE: 16 BRPM | OXYGEN SATURATION: 100 % | WEIGHT: 167.2 LBS | HEART RATE: 87 BPM | SYSTOLIC BLOOD PRESSURE: 113 MMHG

## 2024-02-13 DIAGNOSIS — J01.00 ACUTE MAXILLARY SINUSITIS, RECURRENCE NOT SPECIFIED: Primary | ICD-10-CM

## 2024-02-13 PROCEDURE — 99213 OFFICE O/P EST LOW 20 MIN: CPT | Performed by: FAMILY MEDICINE

## 2024-02-13 ASSESSMENT — PAIN SCALES - GENERAL: PAINLEVEL: MILD PAIN (2)

## 2024-02-13 NOTE — PROGRESS NOTES
Assessment & Plan   Laney got a course of amoxicillin from [facility] which she was prescribed for just a week.  This did not completely treat her sinusitis.  Discussed that current recommendation for first-line treatment is Augmentin and treatment last a week, though I am not criticizing the provider who gave her the amoxicillin.  She is welcome to try giving it a few more days and see if she feels better before starting Augmentin.    Another factor is that she has had exposure to a family member with x-ray proven community-acquired pneumonia.  Her lung exam was normal and her symptoms do suggest sinusitis, but if she develops more pneumonia type symptoms the Augmentin would not cover mycoplasma pneumonia.  I asked her please to contact me if she is getting worse despite the Augmentin.    Acute maxillary sinusitis, recurrence not specified  - amoxicillin-clavulanate (AUGMENTIN) 875-125 MG tablet  Dispense: 20 tablet; Refill: 0          Subjective   Laney is a 43 year old, presenting for the following health issues:  Recheck Medication and History of Present Illness (Sinus pressure and drainage, pain in upper teeth and cheek.some SOB. she did test positive for covid 1/23)      2/13/2024     7:50 AM   Additional Questions   Roomed by perla ng   Accompanied by self     History of Present Illness       Reason for visit:  Sinus issues    She eats 4 or more servings of fruits and vegetables daily.She consumes 0 sweetened beverage(s) daily.She exercises with enough effort to increase her heart rate 20 to 29 minutes per day.  She exercises with enough effort to increase her heart rate 5 days per week.   She is taking medications regularly.     People at work are noticing Laney's  coughing    She has been having sinus stuff since early January - pressure in frontal area and cheeks (right cheek ongoing - with pressure in upper teeth) - was getting better . Then got hit with Covid on the January 23rd - [facility]  - she did get amoxicillin prescription to take for 1 week - she felt like it took off some pressure - took that staring January 24th. A week later started mucinex  after amoxicillin - that also helped.  On February 4th was still feeling sinus pressure and headaches and felt like she was wheezing .   - went to [facility] again but they were hesitant to prescribe further antibiotics,    Last night she felt like she breathed in and whistled in her throat    Yesterday at work and she felt like she couldn't talk - then got a bunch of yellow gunk out    Current symptoms    - congestion   - coughing   - cheek pain   - not feeling short of breath        Objective    /67 (BP Location: Left arm, Patient Position: Sitting, Cuff Size: Adult Large)   Pulse 87   Temp 97.9  F (36.6  C)   Resp 16   Ht 1.676 m (5' 6\")   Wt 75.8 kg (167 lb 3.2 oz)   LMP 02/08/2024 (Approximate)   SpO2 100%   BMI 26.99 kg/m    Body mass index is 26.99 kg/m .  Physical Exam   General appearance - alert, well appearing, and in no distress  Mental status - normal mood, behavior, speech, dress, motor activity, and thought processes  Eyes - conjunctivae clear  Ears - bilateral TM's and external ear canals normal  Nose - sinus tenderness noted right maxillary  Mouth - mucous membranes moist, pharynx normal without lesions  Neck - supple, no significant adenopathy  Chest - clear to auscultation, no wheezes, rales or rhonchi, symmetric air entry          Signed Electronically by: Christiana Beach MD    "

## 2024-04-27 ENCOUNTER — HEALTH MAINTENANCE LETTER (OUTPATIENT)
Age: 44
End: 2024-04-27

## 2024-05-20 ENCOUNTER — TRANSFERRED RECORDS (OUTPATIENT)
Dept: HEALTH INFORMATION MANAGEMENT | Facility: CLINIC | Age: 44
End: 2024-05-20
Payer: COMMERCIAL

## 2024-05-27 SDOH — HEALTH STABILITY: PHYSICAL HEALTH: ON AVERAGE, HOW MANY MINUTES DO YOU ENGAGE IN EXERCISE AT THIS LEVEL?: 20 MIN

## 2024-05-27 SDOH — HEALTH STABILITY: PHYSICAL HEALTH: ON AVERAGE, HOW MANY DAYS PER WEEK DO YOU ENGAGE IN MODERATE TO STRENUOUS EXERCISE (LIKE A BRISK WALK)?: 4 DAYS

## 2024-05-27 ASSESSMENT — SOCIAL DETERMINANTS OF HEALTH (SDOH): HOW OFTEN DO YOU GET TOGETHER WITH FRIENDS OR RELATIVES?: ONCE A WEEK

## 2024-05-28 ENCOUNTER — OFFICE VISIT (OUTPATIENT)
Dept: FAMILY MEDICINE | Facility: CLINIC | Age: 44
End: 2024-05-28
Payer: COMMERCIAL

## 2024-05-28 VITALS
OXYGEN SATURATION: 100 % | WEIGHT: 168.8 LBS | HEART RATE: 77 BPM | RESPIRATION RATE: 10 BRPM | SYSTOLIC BLOOD PRESSURE: 108 MMHG | HEIGHT: 67 IN | TEMPERATURE: 98.1 F | BODY MASS INDEX: 26.49 KG/M2 | DIASTOLIC BLOOD PRESSURE: 68 MMHG

## 2024-05-28 DIAGNOSIS — R76.8 ANA POSITIVE: ICD-10-CM

## 2024-05-28 DIAGNOSIS — Z00.00 ROUTINE GENERAL MEDICAL EXAMINATION AT A HEALTH CARE FACILITY: Primary | ICD-10-CM

## 2024-05-28 DIAGNOSIS — Z13.220 SCREENING, LIPID: ICD-10-CM

## 2024-05-28 DIAGNOSIS — Z12.31 VISIT FOR SCREENING MAMMOGRAM: ICD-10-CM

## 2024-05-28 LAB
CHOLEST SERPL-MCNC: 212 MG/DL
CRP SERPL-MCNC: <3 MG/L
FASTING STATUS PATIENT QL REPORTED: YES
HDLC SERPL-MCNC: 64 MG/DL
LDLC SERPL CALC-MCNC: 137 MG/DL
NONHDLC SERPL-MCNC: 148 MG/DL
TRIGL SERPL-MCNC: 56 MG/DL

## 2024-05-28 PROCEDURE — 86140 C-REACTIVE PROTEIN: CPT | Performed by: FAMILY MEDICINE

## 2024-05-28 PROCEDURE — 99396 PREV VISIT EST AGE 40-64: CPT | Performed by: FAMILY MEDICINE

## 2024-05-28 PROCEDURE — 80061 LIPID PANEL: CPT | Performed by: FAMILY MEDICINE

## 2024-05-28 PROCEDURE — 86038 ANTINUCLEAR ANTIBODIES: CPT | Performed by: FAMILY MEDICINE

## 2024-05-28 PROCEDURE — 36415 COLL VENOUS BLD VENIPUNCTURE: CPT | Performed by: FAMILY MEDICINE

## 2024-05-28 NOTE — PROGRESS NOTES
"Preventive Care Visit  [facility]  Christiana Beach MD, Family Medicine  May 28, 2024      Assessment & Plan     Routine general medical examination at a health care facility    CYNTHIA positive (borderline)  Previous workup for morning stiffness and myalgia, CRP sed rate were normal, however CYNTHIA was borderline elevated.  I would like to recheck these  - Anti Nuclear Mary IgG by IFA with Reflex  - CRP, inflammation    Screening, lipid  - Lipid panel reflex to direct LDL Fasting    Contraception -none currently.  Her  is considering a vasectomy.  Discussed vaginal ring as a possible temporary measure until vasectomy occurs.  She had taken oral contraceptives in the distant past for acne and felt \"prieto.\"  Discussed small but real risk of blood clots with any estrogen-containing medication.  If she calls back or send a [employer] message that she wants to try this, I would be happy to do this or have one of my colleagues prescribe this while I am on sabbatical     Visit for screening mammogram  - MA Screen Bilateral w/Marlon    Return in about 1 year (around 5/28/2025) for Routine preventive, or earlier as needed.      Patient specific instructions:  Thins to consider   - fexofenadine (\"allegra \") oral antihistamine   - Astelin nasal spray (antihistamine)   - Fluticasone or mometasone nasal spray     ---  Calcium in foods:  https://www.nof.org/patients/treatment/calciumvitamin-d/a-guide-to-calcium-rich-foods/     One TUMS pill gives you half daily allowance of calcium    https://www.\[facility] [place] [facility]\"".gov/departments/arriaga-and-recreation/recreation-centers/yelzjsc-drybvy-yxhdkisgnka    BMI  Estimated body mass index is 26.44 kg/m  as calculated from the following:    Height as of this encounter: 1.702 m (5' 7\").    Weight as of this encounter: 76.6 kg (168 lb 12.8 oz).   Weight management plan: She and her  are walking more now that the weather is nice.  I also discussed with but membership to [place] " recreation centers, only $30 for an entire year with access to walking tracks and exercise equipment    Counseling  Appropriate preventive services were discussed with this patient, including applicable screening as appropriate for fall prevention, nutrition, physical activity, Tobacco-use cessation, weight loss and cognition.  Checklist reviewing preventive services available has been given to the patient.  Reviewed patient's diet, addressing concerns and/or questions.   She is at risk for psychosocial distress and has been provided with information to reduce risk.           Subjective   Laney is a 43 year old, presenting for the following:  Physical (Adult preventative)        5/28/2024     7:17 AM   Additional Questions   Roomed by Judy CASTILLO   Accompanied by spouse      Will get  Covid vaccine at pharmacy  Health Care Directive  Patient does not have a Health Care Directive or Living Will: Discussed advance care planning with patient; information given to patient to review.    HPI    Laney has been having some nasal drainage, sneezing and runny nose for about a month.  She thinks she may have seasonal allergies.  She is taking cetirizine but is not clear if this is helping.    No contraception    In January 2023, I saw Laney morning stiffness.  She says this is with exercise.  However she did have a borderline CYNTHIA that came within normal CRP and sed rate        5/27/2024   General Health   How would you rate your overall physical health? Good   Feel stress (tense, anxious, or unable to sleep) Only a little   (!) STRESS CONCERN      5/27/2024   Nutrition   Three or more servings of calcium each day? (!) I DON'T KNOW - discussed    Diet: Regular (no restrictions)   How many servings of fruit and vegetables per day? (!) 2-3   How many sweetened beverages each day? 0-1         5/27/2024   Exercise   Days per week of moderate/strenuous exercise 4 days   Average minutes spent exercising at this level 20 min -  walks at lunch         5/27/2024   Social Factors   Frequency of gathering with friends or relatives Once a week   Worry food won't last until get money to buy more No   Food not last or not have enough money for food? No   Do you have housing?  Yes   Are you worried about losing your housing? No   Lack of transportation? No   Unable to get utilities (heat,electricity)? No         5/27/2024   Dental   Dentist two times every year? Yes         5/27/2024   TB Screening   Were you born outside of the [place]? No         Today's PHQ-2 Score:       2/12/2024     7:22 PM   PHQ-2 ( 1999 Pf[employer])   Q1: Little interest or pleasure in doing things 0   Q2: Feeling down, depressed or hopeless 0   PHQ-2 Score 0   Q1: Little interest or pleasure in doing things Not at all   Q2: Feeling down, depressed or hopeless Not at all   PHQ-2 Score 0         5/27/2024   Substance Use   Alcohol more than 3/day or more than 7/wk No   Do you use any other substances recreationally? No     Social History     Tobacco Use    Smoking status: Never    Smokeless tobacco: Never   Vaping Use    Vaping status: Never Used   Substance Use Topics    Alcohol use: Yes    Drug use: No           5/17/2023   LAST FHS-7 RESULTS   1st degree relative breast or ovarian cancer No   Any relative bilateral breast cancer No   Any male have breast cancer No   Any ONE woman have BOTH breast AND ovarian cancer No   Any woman with breast cancer before 50yrs No   2 or more relatives with breast AND/OR ovarian cancer YesMaternal grandmother and maternal ant - both post-menopausal   2 or more relatives with breast AND/OR bowel cancer Yes     I had referred Laney for genetic counseling, but at the initial visit they said that her mom got genetic counseling that was adequate.  Mammogram Screening - Mammogram every 1-2 years updated in Health Maintenance based on mutual decision making        5/27/2024   STI Screening   New sexual partner(s) since last STI/HIV test? No     History  "of abnormal Pap smear: No - age 30- 64 PAP with HPV every 5 years recommended        Latest Ref Rng & Units 8/25/2020     2:36 PM 3/26/2015     8:45 AM 3/26/2015    12:00 AM   PAP / HPV   PAP Negative for squamous intraepithelial lesion or malignancy. Negative for squamous intraepithelial lesion or malignancy  Electronically signed by Laney Spence CT (ASCP) on 9/4/2020 at  8:56 AM        PAP (Historical)    NIL    HPV 16 DNA NEG Negative  Negative     HPV 18 DNA NEG Negative  Negative     Other HR HPV NEG Negative  Negative       ASCVD Risk   The 10-year ASCVD risk score (Kj AGUILAR, et al., 2019) is: 0.3%    Values used to calculate the score:      Age: 43 years      Sex: Female      Is Non- : No      Diabetic: No      Tobacco smoker: No      Systolic Blood Pressure: 108 mmHg      Is BP treated: No      HDL Cholesterol: 64 mg/dL      Total Cholesterol: 189 mg/dL        5/27/2024   Contraception/Family Planning   Questions about contraception or family planning No        Reviewed and updated as needed this visit by Provider                        ROS  Constitutional: negative for recent illness or change in weight  Eyes: negative for irritation and vision change  Ears, nose, mouth, throat, and face: negative for nasal congestion and sore throat  Respiratory: negative for cough and dyspnea on exertion  Cardiovascular: negative for chest pain and palpitations  Gastrointestinal: negative for abdominal pain and change in bowel habits  Genitourinary:negative for dysuria, frequency and hesitancy  Integument/breast: negative for rash  Hematologic/lymphatic: negative for bleeding and easy bruising         Objective    Exam  /68 (BP Location: Left arm, Patient Position: Sitting, Cuff Size: Adult Regular)   Pulse 77   Temp 98.1  F (36.7  C) (Tympanic)   Resp 10   Ht 1.702 m (5' 7\")   Wt 76.6 kg (168 lb 12.8 oz)   LMP 05/24/2024 (Approximate)   SpO2 100%   BMI 26.44 kg/m   " "  Estimated body mass index is 26.44 kg/m  as calculated from the following:    Height as of this encounter: 1.702 m (5' 7\").    Weight as of this encounter: 76.6 kg (168 lb 12.8 oz).    Physical Exam  General appearance - alert, well appearing, and in no distress  Mental status - normal mood, behavior, speech, dress, motor activity, and thought processes  Eyes - pupils equal and reactive, extraocular eye movements intact, funduscopic exam normal, discs flat and sharp  Ears - bilateral TM's and external ear canals normal  Mouth - mucous membranes moist, pharynx normal without lesions  Neck - supple, no significant adenopathy, carotids upstroke normal bilaterally, no bruits, thyroid exam: thyroid is normal in size without nodules or tenderness  Chest - clear to auscultation, no wheezes, rales or rhonchi, symmetric air entry  Heart - normal rate, regular rhythm, normal S1, S2, no murmurs, rubs, clicks or gallops  Abdomen - soft, nontender, nondistended, no masses or organomegaly  Breasts - breasts appear normal, no suspicious masses, no skin or nipple changes or axillary nodes  Neurological - alert, oriented, normal speech, no focal findings or movement disorder noted, DTR's normal and symmetric  Extremities - peripheral pulses normal, no pedal edema, no clubbing or cyanosis  Skin - no rashes or worrisome lesions          Signed Electronically by: Christiana Beach MD    "

## 2024-05-28 NOTE — PATIENT INSTRUCTIONS
"Thins to consider   - fexofenadine (\"allegra \") oral antihistamine   - Astelin nasal spray (antihistamine)   - Fluticasone or mometasone nasal spray     ---  https://www.nof.org/patients/treatment/calciumvitamin-d/a-guide-to-calcium-rich-foods/     One TUMS pill gives you half daily allowance of calcium    https://www.[employer]/departments/arriaga-and-recreation/recreation-centers/qhhcdxg-gotane-dqhoiqgomhv  Preventive Care Advice   This is general advice we often give to help people stay healthy. Your care team may have specific advice just for you. Please talk to your care team about your own preventive care needs.  Lifestyle  Exercise at least 150 minutes each week (30 minutes a day, 5 days a week).  Do muscle strengthening activities 2 days a week. These help control your weight and prevent disease.  No smoking.  Wear sunscreen to prevent skin cancer.  Have your home tested for radon every 2 to 5 years. Radon is a colorless, odorless gas that can harm your lungs. To learn more, go to www.health.[facility].mn. and search for \"Radon in Homes.\"  Keep guns unloaded and locked up in a safe place like a safe or gun vault, or, use a gun lock and hide the keys. Always lock away bullets separately. To learn more, visit [employer].mn.gov and search for \"safe gun storage.\"  Nutrition  Eat 5 or more servings of fruits and vegetables each day.  Try wheat bread, brown rice and whole grain pasta (instead of white bread, rice, and pasta).  Get enough calcium and vitamin D. Check the label on foods and aim for 100% of the RDA (recommended daily allowance).  Regular exams  Have a dental exam and cleaning every 6 months.  See your health care team every year to talk about:  Any changes in your health.  Any medicines your care team has prescribed.  Preventive care, family planning, and ways to prevent chronic diseases.  Shots (vaccines)   HPV shots (up to age 26), if you've never had them before.  Hepatitis B shots (up to age 59), if you've " never had them before.  COVID-19 shot: Get this shot when it's due.  Flu shot: Get a flu shot every year.  Tetanus shot: Get a tetanus shot every 10 years.  Pneumococcal, hepatitis A, and RSV shots: Ask your care team if you need these based on your risk.  Shingles shot (for age 50 and up).  General health tests  Diabetes screening:  Starting at age 35, Get screened for diabetes at least every 3 years.  If you are younger than age 35, ask your care team if you should be screened for diabetes.  Cholesterol test: At age 39, start having a cholesterol test every 5 years, or more often if advised.  Bone density scan (DEXA): At age 50, ask your care team if you should have this scan for osteoporosis (brittle bones).  Hepatitis C: Get tested at least once in your life.  Abdominal aortic aneurysm screening: Talk to your doctor about having this screening if you:  Have ever smoked; and  Are biologically male; and  Are between the ages of 65 and 75.  STIs (sexually transmitted infections)  Before age 24: Ask your care team if you should be screened for STIs.  After age 24: Get screened for STIs if you're at risk. You are at risk for STIs (including HIV) if:  You are sexually active with more than one person.  You don't use condoms every time.  You or a partner was diagnosed with a sexually transmitted infection.  If you are at risk for HIV, ask about PrEP medicine to prevent HIV.  Get tested for HIV at least once in your life, whether you are at risk for HIV or not.  Cancer screening tests  Cervical cancer screening: If you have a cervix, begin getting regular cervical cancer screening tests at age 21. Most people who have regular screenings with normal results can stop after age 65. Talk about this with your provider.  Breast cancer scan (mammogram): If you've ever had breasts, begin having regular mammograms starting at age 40. This is a scan to check for breast cancer.  Colon cancer screening: It is important to start  screening for colon cancer at age 45.  Have a colonoscopy test every 10 years (or more often if you're at risk) Or, ask your provider about stool tests like a FIT test every year or Cologuard test every 3 years.  To learn more about your testing options, visit: www.[employer]/053747.pdf.  For help making a decision, visit: noa/nq23775.  Prostate cancer screening test: If you have a prostate and are age 55 to 69, ask your provider if you would benefit from a yearly prostate cancer screening test.  Lung cancer screening: If you are a current or former smoker age 50 to 80, ask your care team if ongoing lung cancer screenings are right for you.  For informational purposes only. Not to replace the advice of your health care provider. Copyright   2023 [facility] [employer]. All rights reserved. Clinically reviewed by the [facility] Transitions Program. [employer] 123173 - REV 04/24.    Learning About Stress  What is stress?     Stress is your body's response to a hard situation. Your body can have a physical, emotional, or mental response. Stress is a fact of life for most people, and it affects everyone differently. What causes stress for you may not be stressful for someone else.  A lot of things can cause stress. You may feel stress when you go on a job interview, take a test, or run a race. This kind of short-term stress is normal and even useful. It can help you if you need to work hard or react quickly. For example, stress can help you finish an important job on time.  Long-term stress is caused by ongoing stressful situations or events. Examples of long-term stress include long-term health problems, ongoing problems at work, or conflicts in your family. Long-term stress can harm your health.  How does stress affect your health?  When you are stressed, your body responds as though you are in danger. It makes hormones that speed up your heart, make you breathe faster, and give you a burst of energy.  This is called the fight-or-flight stress response. If the stress is over quickly, your body goes back to normal and no harm is done.  But if stress happens too often or lasts too long, it can have bad effects. Long-term stress can make you more likely to get sick, and it can make symptoms of some diseases worse. If you tense up when you are stressed, you may develop neck, shoulder, or low back pain. Stress is linked to high blood pressure and heart disease.  Stress also harms your emotional health. It can make you prieto, tense, or depressed. Your relationships may suffer, and you may not do well at work or school.  What can you do to manage stress?  You can try these things to help manage stress:   Do something active. Exercise or activity can help reduce stress. Walking is a great way to get started. Even everyday activities such as housecleaning or yard work can help.  Try yoga or salina chi. These techniques combine exercise and meditation. You may need some training at first to learn them.  Do something you enjoy. For example, listen to music or go to a movie. Practice your hobby or do volunteer work.  Meditate. This can help you relax, because you are not worrying about what happened before or what may happen in the future.  Do guided imagery. Imagine yourself in any setting that helps you feel calm. You can use online videos, books, or a teacher to guide you.  Do breathing exercises. For example:  From a standing position, bend forward from the waist with your knees slightly bent. Let your arms dangle close to the floor.  Breathe in slowly and deeply as you return to a standing position. Roll up slowly and lift your head last.  Hold your breath for just a few seconds in the standing position.  Breathe out slowly and bend forward from the waist.  Let your feelings out. Talk, laugh, cry, and express anger when you need to. Talking with supportive friends or family, a counselor, or a marcia leader about your  "feelings is a healthy way to relieve stress. Avoid discussing your feelings with people who make you feel worse.  Write. It may help to write about things that are bothering you. This helps you find out how much stress you feel and what is causing it. When you know this, you can find better ways to cope.  What can you do to prevent stress?  You might try some of these things to help prevent stress:  Manage your time. This helps you find time to do the things you want and need to do.  Get enough sleep. Your body recovers from the stresses of the day while you are sleeping.  Get support. Your family, friends, and community can make a difference in how you experience stress.  Limit your news feed. Avoid or limit time on social media or news that may make you feel stressed.  Do something active. Exercise or activity can help reduce stress. Walking is a great way to get started.  Where can you learn more?  Go to https://www.[employer].net/patiented  Enter N032 in the search box to learn more about \"Learning About Stress.\"  Current as of: October 24, 2023               Content Version: 14.0    1125-7184 [employer].   Care instructions adapted under license by your healthcare professional. If you have questions about a medical condition or this instruction, always ask your healthcare professional. [employer] disclaims any warranty or liability for your use of this information.      "

## 2024-05-29 ENCOUNTER — MYC MEDICAL ADVICE (OUTPATIENT)
Dept: FAMILY MEDICINE | Facility: CLINIC | Age: 44
End: 2024-05-29
Payer: COMMERCIAL

## 2024-05-29 LAB — ANA SER QL IF: NEGATIVE

## 2024-06-10 ENCOUNTER — ANCILLARY PROCEDURE (OUTPATIENT)
Dept: MAMMOGRAPHY | Facility: CLINIC | Age: 44
End: 2024-06-10
Attending: FAMILY MEDICINE
Payer: COMMERCIAL

## 2024-06-10 DIAGNOSIS — Z12.31 VISIT FOR SCREENING MAMMOGRAM: ICD-10-CM

## 2024-06-10 PROCEDURE — 77063 BREAST TOMOSYNTHESIS BI: CPT | Mod: TC | Performed by: RADIOLOGY

## 2024-06-10 PROCEDURE — 77067 SCR MAMMO BI INCL CAD: CPT | Mod: TC | Performed by: RADIOLOGY

## 2024-09-03 ENCOUNTER — TRANSFERRED RECORDS (OUTPATIENT)
Dept: HEALTH INFORMATION MANAGEMENT | Facility: CLINIC | Age: 44
End: 2024-09-03
Payer: COMMERCIAL

## 2024-10-26 ENCOUNTER — IMMUNIZATION (OUTPATIENT)
Dept: FAMILY MEDICINE | Facility: CLINIC | Age: 44
End: 2024-10-26
Payer: COMMERCIAL

## 2024-10-26 PROCEDURE — 90656 IIV3 VACC NO PRSV 0.5 ML IM: CPT

## 2024-10-26 PROCEDURE — 90471 IMMUNIZATION ADMIN: CPT

## 2024-11-12 ENCOUNTER — OFFICE VISIT (OUTPATIENT)
Dept: FAMILY MEDICINE | Facility: CLINIC | Age: 44
End: 2024-11-12
Payer: COMMERCIAL

## 2024-11-12 VITALS
HEART RATE: 92 BPM | WEIGHT: 169.3 LBS | SYSTOLIC BLOOD PRESSURE: 121 MMHG | DIASTOLIC BLOOD PRESSURE: 63 MMHG | TEMPERATURE: 98 F | RESPIRATION RATE: 17 BRPM | OXYGEN SATURATION: 100 % | HEIGHT: 67 IN | BODY MASS INDEX: 26.57 KG/M2

## 2024-11-12 DIAGNOSIS — N92.6 IRREGULAR MENSES: Primary | ICD-10-CM

## 2024-11-12 DIAGNOSIS — R30.0 DYSURIA: ICD-10-CM

## 2024-11-12 LAB
ALBUMIN UR-MCNC: NEGATIVE MG/DL
APPEARANCE UR: CLEAR
BACTERIA #/AREA URNS HPF: ABNORMAL /HPF
BILIRUB UR QL STRIP: NEGATIVE
COLOR UR AUTO: YELLOW
GLUCOSE UR STRIP-MCNC: NEGATIVE MG/DL
HGB UR QL STRIP: ABNORMAL
KETONES UR STRIP-MCNC: NEGATIVE MG/DL
LEUKOCYTE ESTERASE UR QL STRIP: NEGATIVE
NITRATE UR QL: NEGATIVE
PH UR STRIP: 6 [PH] (ref 5–8)
RBC #/AREA URNS AUTO: ABNORMAL /HPF
SP GR UR STRIP: 1.01 (ref 1–1.03)
SQUAMOUS #/AREA URNS AUTO: ABNORMAL /LPF
TSH SERPL DL<=0.005 MIU/L-ACNC: 2.9 UIU/ML (ref 0.3–4.2)
UROBILINOGEN UR STRIP-ACNC: 0.2 E.U./DL
WBC #/AREA URNS AUTO: ABNORMAL /HPF

## 2024-11-12 PROCEDURE — 81001 URINALYSIS AUTO W/SCOPE: CPT | Performed by: FAMILY MEDICINE

## 2024-11-12 PROCEDURE — 36415 COLL VENOUS BLD VENIPUNCTURE: CPT | Performed by: FAMILY MEDICINE

## 2024-11-12 PROCEDURE — 99213 OFFICE O/P EST LOW 20 MIN: CPT | Performed by: FAMILY MEDICINE

## 2024-11-12 PROCEDURE — G2211 COMPLEX E/M VISIT ADD ON: HCPCS | Performed by: FAMILY MEDICINE

## 2024-11-12 PROCEDURE — 84443 ASSAY THYROID STIM HORMONE: CPT | Performed by: FAMILY MEDICINE

## 2024-11-12 ASSESSMENT — PAIN SCALES - GENERAL: PAINLEVEL_OUTOF10: NO PAIN (0)

## 2024-11-12 NOTE — PROGRESS NOTES
Assessment & Plan     Dysuria  - UA Macroscopic with reflex to Microscopic and Culture - Lab Collect  - UA Microscopic with Reflex to Culture  Results normal    Irregular menses  - TSH with free T4 reflex -pending    Mychart message:      Laney,     Your urine test is normal - if you are looking at the results, squamous epithelial cells are just skin cells that wash of in the urine.     I did not do any testing for vaginal infections, but sometimes these also cause irritation with urination. If this symptom persist I can put in an order for you to come back for a self swab.     If you pelvic pain continues and is no just with your menses, please let me know.     Please let me know if you have any questions.     Christiana Beach MD    Return in about 6 months (around 5/12/2025) for Routine preventive, or earlier as needed, if symptoms fail to improve or worsen.      Subjective   Laney is a 44 year old, presenting for the following health issues:  office visit (Patient reports they are noticing her urine has been an odd color in the past week, and reports having pains in lower abdomen before emptying bladder. Says that a few times when urinating it can be painful. Having her period every 15 days. )        11/12/2024     8:29 AM   Additional Questions   Roomed by Monique   Accompanied by alone         11/12/2024     8:29 AM   Patient Reported Additional Medications   Patient reports taking the following new medications none     History of Present Illness       Reason for visit:  I have questions about my health  Symptom onset:  3-7 days ago  Symptoms include:  Health changes  Symptom intensity:  Mild  Symptom progression:  Staying the same  Had these symptoms before:  No  What makes it worse:  No  What makes it better:  No   She is taking medications regularly.     Laney describes weird  colored urine  which she notice 5 days ago and resolved 3 days ago- a little pink . Also  in the last week  a dull ache in pelvic  "area    She had little pain with urination last week, no frequency, mild sense of pressure before urination that resolves with urination     She also describes irregular menstrual bleeding:    Spotting 10/20  Bleeding 10/22 - 10/24 - light then finished 10/29  Then noticed spotting again  9/22 spotting  Bleeding 9/25 - end 10/5  Tends to get pelvic pain very mild with menses    Wt Readings from Last 5 Encounters:   11/12/24 76.8 kg (169 lb 4.8 oz)   05/28/24 76.6 kg (168 lb 12.8 oz)   02/13/24 75.8 kg (167 lb 3.2 oz)   03/17/23 72.6 kg (160 lb)   03/01/22 74.8 kg (164 lb 14.4 oz)     ROS  No change in appetite  No bloating  A little vaginal itching -but that has come and gone for some time  No concerns for STI        Objective    /63 (BP Location: Left arm, Patient Position: Sitting, Cuff Size: Adult Regular)   Pulse 92   Temp 98  F (36.7  C) (Tympanic)   Resp 17   Ht 1.689 m (5' 6.5\")   Wt 76.8 kg (169 lb 4.8 oz)   LMP 11/11/2024 (Exact Date)   SpO2 100%   Breastfeeding No   BMI 26.92 kg/m    Body mass index is 26.92 kg/m .  Physical Exam   GENERAL: alert and no distress  PSYCH: mentation appears normal, affect normal/bright            Signed Electronically by: Christiana Beach MD    "

## 2024-11-26 ENCOUNTER — LAB (OUTPATIENT)
Dept: LAB | Facility: CLINIC | Age: 44
End: 2024-11-26
Payer: COMMERCIAL

## 2024-11-26 DIAGNOSIS — R30.0 DYSURIA: ICD-10-CM

## 2024-11-26 LAB
CLUE CELLS: ABNORMAL
TRICHOMONAS, WET PREP: ABNORMAL
WBC'S/HIGH POWER FIELD, WET PREP: ABNORMAL
YEAST, WET PREP: ABNORMAL

## 2024-11-26 PROCEDURE — 87210 SMEAR WET MOUNT SALINE/INK: CPT

## 2025-04-28 ENCOUNTER — PATIENT OUTREACH (OUTPATIENT)
Dept: CARE COORDINATION | Facility: CLINIC | Age: 45
End: 2025-04-28
Payer: COMMERCIAL

## 2025-05-12 ENCOUNTER — PATIENT OUTREACH (OUTPATIENT)
Dept: CARE COORDINATION | Facility: CLINIC | Age: 45
End: 2025-05-12
Payer: COMMERCIAL

## 2025-06-04 ENCOUNTER — OFFICE VISIT (OUTPATIENT)
Dept: FAMILY MEDICINE | Facility: CLINIC | Age: 45
End: 2025-06-04
Payer: COMMERCIAL

## 2025-06-04 VITALS
HEART RATE: 93 BPM | RESPIRATION RATE: 14 BRPM | SYSTOLIC BLOOD PRESSURE: 116 MMHG | TEMPERATURE: 97.5 F | WEIGHT: 174 LBS | HEIGHT: 66 IN | OXYGEN SATURATION: 99 % | BODY MASS INDEX: 27.97 KG/M2 | DIASTOLIC BLOOD PRESSURE: 62 MMHG

## 2025-06-04 DIAGNOSIS — M79.672 LEFT FOOT PAIN: Primary | ICD-10-CM

## 2025-06-04 PROCEDURE — 99213 OFFICE O/P EST LOW 20 MIN: CPT

## 2025-06-04 ASSESSMENT — PAIN SCALES - GENERAL: PAINLEVEL_OUTOF10: MILD PAIN (3)

## 2025-06-04 NOTE — PATIENT INSTRUCTIONS
-Rest: Is important to allow your body to rest while it heals.  Though I do not recommend full immobilization, I do want you to do your best to continue on with normal daily activity in a gentle manner.  Please try to avoid any overexertion, or specific activities that seem to aggravate the pain  -Ice: You can apply ice to the area for 15 to 20 minutes at a time a number of times a day as you tolerate.  Ice is generally best in the first 1 to 2 weeks after injury, as it can be helpful not only to manage pain, but also to reduce inflammation.  If you find that heat is more soothing to the area, you can also utilize a warm pack in the same manner.  -Compression: If you are experiencing significant swelling, you can use an Ace wrap, or soft brace to apply compression to the area.  This can provide stability, reduce swelling, and occasionally manage excess pain in the affected area.  -Elevation: When you are able to be sitting and resting, please elevate the affected extremity your heart level.  This helps to allow blood to flow back to the heart, and to reduce excess swelling.  -Pain Management: You can utilize over-the-counter ibuprofen and Tylenol as you tolerate, and as the over-the-counter packaging describes to manage pain.  If you have been told by a provider in the past that it is not safe for you to take either of these medications, please abide by that recommendation and continue to avoid them moving forward.    Follow-up in 4 weeks if symptoms are not improving    Please seek immediate medical attention with symptoms including severe pain or swelling, loss of strength or sensation, fever, chills, body aches, nausea and vomiting, or inability to move the affected extremity

## 2025-06-04 NOTE — PROGRESS NOTES
Assessment & Plan     (M79.672) Left foot pain  (primary encounter diagnosis)  Comment: Acute and stable.  No concerns for gout, septic joint, fracture, or dislocation.  Physical exam findings and HPI are more supportive of a tendinitis type discomfort of the foot.  Difficult to determine what could have caused this, but could be as simple as unknown overuse, or poorly fitting shoes.  Will begin managing more conservatively with cycling ibuprofen and Tylenol, ice, rest, and gentle stretching.  If this plan of care is not beneficial, will likely need to reconsider imaging via x-ray, and a more formal referral to physical therapy, and potentially referral to Ortho. Offered education on medications including appropriate dosing, possible side effects, and possible adverse effects.  Education given on return to clinic instructions as well as alarm signs that would require the need for immediate medical attention.  Patient attested to understanding.  Plan: RICE therapy and dedicated rehab stretching    This progress note has been dictated, with use of voice recognition software. Any grammatical, typographical, or context errors are unintentional and inherent to use of voice recognition software.     I spent a total of 14 minutes on the day of the visit.   Time spent by me today doing chart review, history and exam, documentation and further activities per the note      Follow-up   Follow-up with PCP in 2 to 4 weeks if symptoms are not improving      Subjective   Laney is a 44 year old, presenting for the following health issues:  Musculoskeletal Problem (Left foot pain/distal. with no injury.  Pain moving into her heel.)        6/4/2025     6:55 AM   Additional Questions   Roomed by Angelica CLARKE     Musculoskeletal Problem    History of Present Illness       Reason for visit:  Foot pain  Symptom onset:  1-3 days ago  Symptoms include:  Foot pain  Symptom intensity:  Mild  Symptom progression:  Worsening  Had these  "symptoms before:  No  What makes it worse:  Walking, wearing shoes, moving my foot certain ways  What makes it better:  Icing my foot   She is taking medications regularly.      Laney is a 44-year-old female with a past medical history significant for SI joint pain who presents today with concerns for left-sided foot pain.  Patient reports that in the last 1 to 2 weeks she has been experiencing some discomfort in the arch of her left foot dorsally that was very intermittent.  As the weeks went on, and most prominently over the last 4 to 5 days, she has been experiencing more significant pain in the distal aspect of her medial left foot that is now radiating into the lateral aspect of her left heel.  She does describe it as a quite constant aching pain, and is occasionally experiencing some very mild numbness and tingling on the bottoms of her toes that is not persistent.  She reports that it is made worse by increased activity such as walking, wearing certain shoes, and moving her foot in a certain direction.  She has not specifically found anything that seems to relieve the pain well aside from resting and icing.  She declines any swelling, redness, heat, weakness, pain that radiates up her leg, or known injury to the extremity.  She feels well otherwise without concerns for fever, chills, body aches, fatigue, nausea, or vomiting.  She declines that she has ever had a concerns similar to this in the past.  She declines wearing new shoes or shoes that generally aggravate her feet, or engaging in increased physical activity more recently.      Review of Systems  Constitutional, HEENT, cardiovascular, pulmonary, gi and gu systems are negative, except as otherwise noted.      Objective    /62   Pulse 93   Temp 97.5  F (36.4  C) (Temporal)   Resp 14   Ht 1.689 m (5' 6.5\")   Wt 78.9 kg (174 lb)   SpO2 99%   BMI 27.67 kg/m    Body mass index is 27.67 kg/m .  Physical Exam   GENERAL: alert and no " distress  NECK: no adenopathy, no asymmetry, masses, or scars  RESP: lungs clear to auscultation - no rales, rhonchi or wheezes  CV: regular rate and rhythm, normal S1 S2, no S3 or S4, no murmur, click or rub, no peripheral edema  ABDOMEN: soft, nontender, no hepatosplenomegaly, no masses and bowel sounds normal  MS: no gross musculoskeletal defects noted, no edema, erythema, or open sores or lesions.  Range of motion, strength, and sensation intact in ankle and foot bilaterally.  Pain reproduced with left hip piriformis stretch lateral left heel, and pain to palpation over left lateral heel    Radha Lopez DNP FNP-C  Family Nurse Practitioner - Same Day Provider  [facility] [place]        Signed Electronically by: HERBERT Kwon CNP

## 2025-06-09 ENCOUNTER — PATIENT OUTREACH (OUTPATIENT)
Dept: CARE COORDINATION | Facility: CLINIC | Age: 45
End: 2025-06-09
Payer: COMMERCIAL

## 2025-07-13 ENCOUNTER — HEALTH MAINTENANCE LETTER (OUTPATIENT)
Age: 45
End: 2025-07-13

## 2025-08-24 ENCOUNTER — HEALTH MAINTENANCE LETTER (OUTPATIENT)
Age: 45
End: 2025-08-24